# Patient Record
Sex: FEMALE | Race: WHITE | HISPANIC OR LATINO | Employment: FULL TIME | URBAN - METROPOLITAN AREA
[De-identification: names, ages, dates, MRNs, and addresses within clinical notes are randomized per-mention and may not be internally consistent; named-entity substitution may affect disease eponyms.]

---

## 2018-01-16 ENCOUNTER — OFFICE VISIT (OUTPATIENT)
Dept: MEDICAL GROUP | Facility: MEDICAL CENTER | Age: 38
End: 2018-01-16
Payer: COMMERCIAL

## 2018-01-16 VITALS
DIASTOLIC BLOOD PRESSURE: 98 MMHG | TEMPERATURE: 98.3 F | HEIGHT: 65 IN | BODY MASS INDEX: 48.82 KG/M2 | SYSTOLIC BLOOD PRESSURE: 138 MMHG | HEART RATE: 84 BPM | WEIGHT: 293 LBS | RESPIRATION RATE: 16 BRPM | OXYGEN SATURATION: 95 %

## 2018-01-16 DIAGNOSIS — E11.42 TYPE 2 DIABETES MELLITUS WITH DIABETIC POLYNEUROPATHY, WITHOUT LONG-TERM CURRENT USE OF INSULIN (HCC): ICD-10-CM

## 2018-01-16 DIAGNOSIS — G89.29 CHRONIC PAIN OF LEFT ANKLE: ICD-10-CM

## 2018-01-16 DIAGNOSIS — M25.572 CHRONIC PAIN OF LEFT ANKLE: ICD-10-CM

## 2018-01-16 DIAGNOSIS — G47.33 OBSTRUCTIVE SLEEP APNEA SYNDROME: ICD-10-CM

## 2018-01-16 DIAGNOSIS — E28.2 PCO (POLYCYSTIC OVARIES): ICD-10-CM

## 2018-01-16 DIAGNOSIS — E66.01 MORBID OBESITY WITH BMI OF 50.0-59.9, ADULT (HCC): ICD-10-CM

## 2018-01-16 DIAGNOSIS — R05.3 CHRONIC COUGH: ICD-10-CM

## 2018-01-16 DIAGNOSIS — N92.1 MENORRHAGIA WITH IRREGULAR CYCLE: ICD-10-CM

## 2018-01-16 DIAGNOSIS — M54.41 ACUTE RIGHT-SIDED LOW BACK PAIN WITH RIGHT-SIDED SCIATICA: ICD-10-CM

## 2018-01-16 PROBLEM — E11.49 TYPE 2 DIABETES MELLITUS WITH NEUROLOGIC COMPLICATION (HCC): Status: ACTIVE | Noted: 2018-01-16

## 2018-01-16 PROCEDURE — 99204 OFFICE O/P NEW MOD 45 MIN: CPT | Performed by: PHYSICIAN ASSISTANT

## 2018-01-16 RX ORDER — BACLOFEN 20 MG/1
20 TABLET ORAL 3 TIMES DAILY
Qty: 60 TAB | Refills: 0 | Status: SHIPPED | OUTPATIENT
Start: 2018-01-16 | End: 2018-04-17 | Stop reason: SDUPTHER

## 2018-01-16 RX ORDER — MEDROXYPROGESTERONE ACETATE 10 MG/1
10 TABLET ORAL DAILY
Qty: 10 TAB | Refills: 0 | Status: SHIPPED | OUTPATIENT
Start: 2018-01-16 | End: 2018-02-16

## 2018-01-16 ASSESSMENT — PATIENT HEALTH QUESTIONNAIRE - PHQ9: CLINICAL INTERPRETATION OF PHQ2 SCORE: 0

## 2018-01-16 NOTE — LETTER
January 16, 2018         Patient: Tara Romero   YOB: 1980   Date of Visit: 1/16/2018           To Whom it May Concern:    Tara Romero was seen in my clinic on 1/16/2018. She may return to work on 1/17/2018.    If you have any questions or concerns, please don't hesitate to call.        Sincerely,           Hakeem Chavarria P.A.-C.  Electronically Signed

## 2018-01-17 ENCOUNTER — HOSPITAL ENCOUNTER (OUTPATIENT)
Dept: LAB | Facility: MEDICAL CENTER | Age: 38
End: 2018-01-17
Attending: PHYSICIAN ASSISTANT
Payer: COMMERCIAL

## 2018-01-17 DIAGNOSIS — E11.42 TYPE 2 DIABETES MELLITUS WITH DIABETIC POLYNEUROPATHY, WITHOUT LONG-TERM CURRENT USE OF INSULIN (HCC): ICD-10-CM

## 2018-01-17 LAB
ALBUMIN SERPL BCP-MCNC: 4.1 G/DL (ref 3.2–4.9)
ALBUMIN/GLOB SERPL: 1.4 G/DL
ALP SERPL-CCNC: 76 U/L (ref 30–99)
ALT SERPL-CCNC: 150 U/L (ref 2–50)
ANION GAP SERPL CALC-SCNC: 7 MMOL/L (ref 0–11.9)
AST SERPL-CCNC: 70 U/L (ref 12–45)
BILIRUB SERPL-MCNC: 0.4 MG/DL (ref 0.1–1.5)
BUN SERPL-MCNC: 14 MG/DL (ref 8–22)
CALCIUM SERPL-MCNC: 8.8 MG/DL (ref 8.5–10.5)
CHLORIDE SERPL-SCNC: 105 MMOL/L (ref 96–112)
CO2 SERPL-SCNC: 26 MMOL/L (ref 20–33)
CREAT SERPL-MCNC: 0.74 MG/DL (ref 0.5–1.4)
EST. AVERAGE GLUCOSE BLD GHB EST-MCNC: 189 MG/DL
GLOBULIN SER CALC-MCNC: 3 G/DL (ref 1.9–3.5)
GLUCOSE SERPL-MCNC: 150 MG/DL (ref 65–99)
HBA1C MFR BLD: 8.2 % (ref 0–5.6)
POTASSIUM SERPL-SCNC: 3.9 MMOL/L (ref 3.6–5.5)
PROT SERPL-MCNC: 7.1 G/DL (ref 6–8.2)
SODIUM SERPL-SCNC: 138 MMOL/L (ref 135–145)

## 2018-01-17 PROCEDURE — 80053 COMPREHEN METABOLIC PANEL: CPT

## 2018-01-17 PROCEDURE — 36415 COLL VENOUS BLD VENIPUNCTURE: CPT

## 2018-01-17 PROCEDURE — 83036 HEMOGLOBIN GLYCOSYLATED A1C: CPT

## 2018-01-17 NOTE — ASSESSMENT & PLAN NOTE
Complains of a chronic cough. Coughing for greater than 3 months. Complains of sinus congestion and drainage as well. Denies any heartburn or reflux. No chest pain or shortness of breath.

## 2018-01-17 NOTE — ASSESSMENT & PLAN NOTE
Was diagnosed in the past with polycystic ovaries. Was on metformin that was helpful in regulating her cycles.

## 2018-01-17 NOTE — ASSESSMENT & PLAN NOTE
Since [place] she has had a constant period. Complains of heavy menses. It did stop for 5 days but then restarted. Denies any vaginal pain.

## 2018-01-17 NOTE — ASSESSMENT & PLAN NOTE
States that at times she will pass out for a couple seconds and then wake up. States previously she's been told that she stop breathing when she slept.

## 2018-01-17 NOTE — ASSESSMENT & PLAN NOTE
This is a 37-year-old female who is here today to establish care. Previously she had diabetes. Has not been taking her medication for quite some time. Has gained significant weight over the past few years.

## 2018-01-17 NOTE — ASSESSMENT & PLAN NOTE
2 days ago she developed low back pain with right-sided sciatica. She states that typically symptoms will resolve in time. 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 She believes that may have been the trigger. His taking ibuprofen with little relief. Needs a note to return to work tomorrow.

## 2018-01-17 NOTE — PROGRESS NOTES
Subjective:   Tara Romero is a 37 y.o. female here today for tablets can to discuss several chronic medical conditions.    Type 2 diabetes mellitus with neurologic complication (CMS-HCC)  This is a 37-year-old female who is here today to establish care. Previously she had diabetes. Has not been taking her medication for quite some time. Has gained significant weight over the past few years.    PCO (polycystic ovaries)  Was diagnosed in the past with polycystic ovaries. Was on metformin that was helpful in regulating her cycles.    Obstructive sleep apnea syndrome  States that at times she will pass out for a couple seconds and then wake up. States previously she's been told that she stop breathing when she slept.    Morbid obesity with BMI of 50.0-59.9, adult ([facility])  As mentioned above she has gained significant weight over the past few years.    Menorrhagia with irregular cycle  Since Christmas she has had a constant period. Complains of heavy menses. It did stop for 5 days but then restarted. Denies any vaginal pain.    Chronic pain of left ankle  Many years ago she sprained her left ankle. Since then she's had pain and swelling in that left ankle.    Acute right-sided low back pain with right-sided sciatica  2 days ago she developed low back pain with right-sided sciatica. She states that typically symptoms will resolve in time. 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 She believes that may have been the trigger. His taking ibuprofen with little relief. Needs a note to return to work tomorrow.    Chronic cough  Complains of a chronic cough. Coughing for greater than 3 months. Complains of sinus congestion and drainage as well. Denies any heartburn or reflux. No chest pain or shortness of breath.       Current medicines (including changes today)  Current Outpatient Prescriptions   Medication Sig Dispense Refill   • baclofen (LIORESAL) 20 MG tablet Take 1 Tab by mouth 3 times a day. 60 Tab 0   •  "medroxyPROGESTERone (PROVERA) 10 MG Tab Take 1 Tab by mouth every day. 10 Tab 0   • metformin (GLUCOPHAGE) 1000 MG tablet Take 1 Tab by mouth 2 times a day, with meals. 60 Tab 3     No current facility-administered medications for this visit.      She  has a past medical history of Hypertension.    ROS   No chest pain, no shortness of breath, no abdominal pain and all other systems were reviewed and are negative.    Past medical history, social history and family history were updated and reviewed.     Objective:     Blood pressure 138/98, pulse 84, temperature 36.8 °C (98.3 °F), resp. rate 16, height 1.651 m (5' 5\"), weight (!) 148.6 kg (327 lb 9.6 oz), last menstrual period 01/16/2018, SpO2 95 %, not currently breastfeeding. Body mass index is 54.52 kg/m².   Physical Exam:  Constitutional: Alert, no distress.  Skin: Warm, dry, good turgor, no rashes in visible areas.  Eye: Equal, round and reactive, conjunctiva clear, lids normal.  ENMT: Lips without lesions, good dentition, oropharynx clear and erythematous.  Neck: Trachea midline, no masses.   Lymph: No cervical or supraclavicular lymphadenopathy  Respiratory: Unlabored respiratory effort, lungs clear to auscultation, no wheezes, no ronchi.  Cardiovascular: Normal S1, S2, no murmur, no edema.  Musculoskeletal: Left ankle does appear to be more swollen than the right side but no significant edema noted. Range of motion is good.  Psych: Alert and oriented x3, normal affect and mood.        Assessment and Plan:   The following treatment plan was discussed    1. Type 2 diabetes mellitus with diabetic polyneuropathy, without long-term current use of insulin (CMS-[facility])  Chronic condition. Status unknown. Ordered labs fasting. We'll hold off on taking metformin 2000 mg twice a day until lab review. Refer to [facility] program.  - Patient identified as having weight management issue.  Appropriate orders and counseling given.  - HEMOGLOBIN A1C; Future  - " REFERRAL TO [facility] (HIP) Services Requested: Medical Weight Management Program; Reason for Referral? BMI>30; Reason for Visit: Overweight/Obesity  - LIPID PANEL  - COMP METABOLIC PANEL; Future  - metformin (GLUCOPHAGE) 1000 MG tablet; Take 1 Tab by mouth 2 times a day, with meals.  Dispense: 60 Tab; Refill: 3    2. Obstructive sleep apnea syndrome  Chronic condition. Refer to sleep studies.  - Patient identified as having weight management issue.  Appropriate orders and counseling given.  - REFERRAL TO [facility] (HIP) Services Requested: Medical Weight Management Program; Reason for Referral? BMI>30; Reason for Visit: Overweight/Obesity  - REFERRAL TO SLEEP STUDIES    3. Acute right-sided low back pain with right-sided sciatica  Acute, new onset condition. Continue ibuprofen as directed. Discussed with taking 600 mg every 8 hours as needed. Provided information exercise low back. Also provided prescription baclofen as directed. Note to return to work tomorrow.  - baclofen (LIORESAL) 20 MG tablet; Take 1 Tab by mouth 3 times a day.  Dispense: 60 Tab; Refill: 0    4. Morbid obesity with BMI of 50.0-59.9, adult (CMS-[facility])  Chronic condition. Referred to medical weight management program. TSH ordered.  - Patient identified as having weight management issue.  Appropriate orders and counseling given.  - REFERRAL TO [facility] (HIP) Services Requested: Medical Weight Management Program; Reason for Referral? BMI>30; Reason for Visit: Overweight/Obesity  - TSH+FREE T4    5. Menorrhagia with irregular cycle  Acute, new onset condition. Refer to gynecology to establish care. Provided Provera 10 mg for 10 days.  - medroxyPROGESTERone (PROVERA) 10 MG Tab; Take 1 Tab by mouth every day.  Dispense: 10 Tab; Refill: 0  - REFERRAL TO GYNECOLOGY    6. PCO (polycystic ovaries)  Referred to gynecology to reestablish care.  - REFERRAL TO GYNECOLOGY  - metformin  (GLUCOPHAGE) 1000 MG tablet; Take 1 Tab by mouth 2 times a day, with meals.  Dispense: 60 Tab; Refill: 3    7. Chronic pain of left ankle  Chronic condition. We'll follow and evaluate in the future visits.      Followup: Return if symptoms worsen or fail to improve.    Please note that this dictation was created using voice recognition software. I have made every reasonable attempt to correct obvious errors, but I expect that there are errors of grammar and possibly content that I did not discover before finalizing the note.

## 2018-01-17 NOTE — ASSESSMENT & PLAN NOTE
Many years ago she sprained her left ankle. Since then she's had pain and swelling in that left ankle.

## 2018-02-06 ENCOUNTER — OFFICE VISIT (OUTPATIENT)
Dept: HEALTH INFORMATION MANAGEMENT | Facility: MEDICAL CENTER | Age: 38
End: 2018-02-06
Payer: COMMERCIAL

## 2018-02-06 VITALS
TEMPERATURE: 98.4 F | WEIGHT: 293 LBS | BODY MASS INDEX: 48.82 KG/M2 | OXYGEN SATURATION: 96 % | SYSTOLIC BLOOD PRESSURE: 118 MMHG | HEIGHT: 65 IN | HEART RATE: 89 BPM | DIASTOLIC BLOOD PRESSURE: 84 MMHG

## 2018-02-06 DIAGNOSIS — G47.33 OBSTRUCTIVE SLEEP APNEA SYNDROME: ICD-10-CM

## 2018-02-06 DIAGNOSIS — R79.89 ABNORMAL LFTS: ICD-10-CM

## 2018-02-06 DIAGNOSIS — E66.01 MORBID OBESITY WITH BMI OF 50.0-59.9, ADULT (HCC): ICD-10-CM

## 2018-02-06 DIAGNOSIS — E11.42 TYPE 2 DIABETES MELLITUS WITH DIABETIC POLYNEUROPATHY, WITHOUT LONG-TERM CURRENT USE OF INSULIN (HCC): ICD-10-CM

## 2018-02-06 DIAGNOSIS — R63.2 BINGE EATING: ICD-10-CM

## 2018-02-06 DIAGNOSIS — N92.1 MENORRHAGIA WITH IRREGULAR CYCLE: ICD-10-CM

## 2018-02-06 DIAGNOSIS — Z86.59 HISTORY OF BULIMIA: ICD-10-CM

## 2018-02-06 PROCEDURE — 93000 ELECTROCARDIOGRAM COMPLETE: CPT | Performed by: INTERNAL MEDICINE

## 2018-02-06 PROCEDURE — 99204 OFFICE O/P NEW MOD 45 MIN: CPT | Mod: 25 | Performed by: INTERNAL MEDICINE

## 2018-02-06 PROCEDURE — 97802 MEDICAL NUTRITION INDIV IN: CPT | Performed by: DIETITIAN, REGISTERED

## 2018-02-06 NOTE — LETTER
Medical Weight Management  Patient Consent Form For Contrave (Naltrexone/buproprion)    I hereby authorize the physician and their staff to assist me in my weight reduction efforts. I understand that my treatment program consists of a balanced diet, a regular exercise program, instruction in behavior payton?cation techniques, meeting with a registered dietician, and the use of the appetite suppressant medication Contrave. I also understand that regular medical visits will be necessary while on the medication and that Contrave must be used with caution and under direct supervision of the physician.    Risk of Proposed Treatment: I understand that any medical treatment may involve risks as well as the proposed bene?ts of weight loss. I understand that this authorization is given with the knowledge that the use of Contrave involves risk. Risks of Contrave include but are not limited to suicidal thoughts, sudden decrease in vision, glaucoma, birth defects in your unborn child if you take this medication while pregnant, hypoglycemia.  Risks can also include nervousness, diarrhea, constipation, sleeplessness, headache, tremor, fever, fainting, dry mouth, rash, change in libido, difficulty urinating, shortness of breath, swelling of feet or ankles, tiredness, dizziness, weakness, allergic reactions, psychological imbalances, hallucinations, stomach cramps, high blood pressure, palpitations, arrhythmias, rapid heart rate, hepatitis, wilfred, depression, insomnia. In case of serious side effects or if you become pregnant, stop taking the Contrave and seek immediate medical assistance. I also understand that there are certain health risks associated with remaining overweight or obese, including high blood pressure, diabetes, heart attack and heart disease, arthritis of the joints, sleep apnea, and sudden death. Do not use with alcohol.    I further understand that Contrave should not be used by people who suffer from  glaucoma, seizures, anorexia or bulimia, those with a history of drug dependency, opioid dependency, alcoholism, psychotic illness, uncontrolled hypertension, pregnancy, on MAOI’s, serotonin migraine medications, Wellbutrin or other buproprion-containing medication, or lithium.    While taking Contrave, avoid taking the following medications: Decongestant medications (Sudafed/Pseudoephedrine, Tylenol Sinus, Claritin D, Zyrtec D and Allegra D), Stimulant medications, high doses of caffeine, other weight loss medications, ephedrine MAO inhibitions and alcohol.    Patient Responsibility:   As the patient, I understand it is my responsibility to follow instructions carefully, and to report to the physician any significant medical problems that I think may be related to my weight control program as soon as possible. I agree to notify the physician of any medical problems that I may have or any results of labs/tests ordered and reviewed by any other physician. I further acknowledge that I enter into this program in full knowledge and understanding that no physician, provider, or staff of the weight loss physician has prior knowledge as to whether I would or would not have adverse effects due to the fact that each individual has a different biological and chemical make -up. I understand the purpose of this treatment is to assist me in my desire to decrease my body weight and to maintain this weight loss. I understand my continuing to receive Contrave will be dependent on my progress in weight reduction and weight maintenance.  If I am female, I will need to have a monthly negative pregnancy test in order to receive the next month’s prescription. I understand that a balanced caloric counting program combined with regular exercise without the use of Contrave may likely prove successful if followed, even though I would be hungrier than without the suppressant.    I am also in full understanding that Contrave will be used no  longer than 3 consecutive months. After 3 months of use the medication will be discontinued.     If you and the physician agree to use the medication longer than 3 months or if your BMI has decreased below the Federal Drug Administration's recommended value you will be using the medication in an off-label manner.  Contrave may result in lethargy or depression with abrupt discontinuation and I understand that during the program, medications will be discontinued if:  1.) I become pregnant, try to become pregnant, or suspect that I am pregnant.  2.) I develop a contraindication or serious side effect of the medication.  3.) I do not comply with medical requirements, i.e. visits, med doses, etc.  4.) I fail to lose and/or maintain weight appropriately.  5.) I have a planned surgery. Medications are to be stopped at least 2 weeks prior to any surgical procedure requiring general anesthesia.    Women Only: I understand Contrave should not be taken during pregnancy, due to the chance of damage to the fetus. This has been explained to me fully, and I am aware of the risks involved. To the best of my knowledge, I am not pregnant. I am aware of the precautions that should be taken to avoid pregnancy while I am on the medication. If I become pregnant, I will advise both the physician and my OB/GYN immediately. In addition, Contrave is not to be used while breast feeding.    No Guarantee:  I understand that much of the success of the program will depend on my effort, and that there is no guarantee that the program will be successful.  I understand that I will have to continue with sensible and nutritional eating habits and regular exercise all my life, if I am able to be successful long-term.     Patient Consent/Waiver:   I have read and fully understand this document and authorize and accept the proposed care regardless of the risk. I affirm that my questions have been satisfactorily answered at this time. I realize that I  should not sign this form if all items are not understood by me or if questions have not been answered to my satisfaction. I hereby release the physician and [facility] from any liability associated and connected with my participation in this weight loss program.  I accept the risks as discussed above, in hopes of obtaining desired bene?cial results of weight loss treatment. I understand it is my responsibility to give the physician the name of my primary care physician where labs and/or EKG can be obtained for follow through and interpretation, if need be.     WARNING: If you have any questions as to the risks or hazards of the proposed treatment, or any questions whatsoever concerning the proposed treatment or other possible treatments, ask the physician now before signing this consent form. To conclude, by signing this document you are agreeing to the risks associated with Contrave. You are agreeing that to be successful in your weight loss goals you must alter your lifestyle and adopt healthy eating and exercise patterns. You are agreeing that you are not pregnant or breast feeding. You are agreeing that you understand Contrave may, in rare instances, be addictive. You are agreeing that you must notify the physician of any medical conditions current or that develop while taking Contrave and you are agreeing that this document has been adequately explained to you and that you understand the document in its entirety.    Alternatives to treatment, and no treatment, including the risks and benefits thereof have been discussed with me.      _____________________________________  Patient / Authorized Legal Representative    _____________________________________  Relationship to Patient (if not patient)    _____________________________________  Date/Time    Provider Declaration:   I have explained the contents of this document to the patient and have answered all the patient’s related questions. To the best of my  knowledge, I feel the patient has been adequately informed concerning the bene?ts and risks associated with the use of Contrave, the bene?ts and risks associated with alternative therapies, and the risks concerning an overweight status. After being adequately informed, the patient has consented.      _____________________________________  Physician Signature    _____________________________________  Physician Name (printed)     _____________________________________  Date/Time

## 2018-02-06 NOTE — PROGRESS NOTES
"2/6/2018 37 y.o.   Referring Provider: Hakeem Chavarria P.A.-C.       Time in/out:  08:52-9:23am     Anthropometrics/Objective  Today's weight:  327.5lb  Height: 65\"  BMI: 54.5   Stated Goal Weight: 160-170lb   See comprehensive patient history form for further information     Subjective:  Pt is here today for the initial screening visit for the medical weight management program.   Over the past 3 weeks she has been working to improve her diet. Is eating more veggies and protein.  Pt works late and snacks throughout the evenings.   Has been making a juice for breakfast and then a protein shake for snack in AM  Juice = 1 apple, 1 lemon, spinach, kale, celery, cucumber, bing   Protein shake = 1 cup apple juice, 1 scoop Garden of Life protein vegan protein pwder (= 130kcal, 22g protein, 5g carbs) with berries or tropical fruit mix.   Asks about steel cut oats, granola with yogurt, for health foods.   Snacks on 1/2 avocado, 1/2 can tuna, lakhani 1 T and few crackers.   Is not interested in meal replacements that we offer here.   However does not like to cook . Brings [employer] meals for dinner or other pre made meals.     See Medical Questionnaire for more detailed diet history     Nutrition Diagnosis (PES Statement)  · Morbid Obesity related to excessive energy intake and inadequate energy expenditure as evidenced by BMI >40      Client history:  Condition(s) associated with a diagnosis or treatment (specify) Type 2 DM, PCO, DAMASO     Biochemical data, medical test and procedures  Lab Results   Component Value Date/Time    HBA1C 8.2 (H) 01/17/2018 07:37 AM   @  No results found for: POCGLUCOSE  No results found for: CHOLSTRLTOT, LDL, HDL, TRIGLYCERIDE      Nutrition Intervention  Nutrition Prescription  Recommended Daily Kcals: 0200-3403 Kcal based on REE based on REE of 2056kcal minus 200-500kcal for rapid weight loss for morbid obesity   Recommended Daily Protein: 115-130g based on ~30%kcal from protein     Meal Plan " "Recommendation   Recommend 0 meal replacements with 3 grocery meals and 3 snacks  per day    Breakfast-  2 eggs with green juice   Snack- 1 scoop protein powder + water or almond milk   Lunch - 4oz protein + ns veggies + 2 healthy fats + up to 1/2 cup starch   Snack - 1oz protein + ns veggies or 1 fruit (no more than 2 fruits a day)   Dinner - 4oz protein + ns veggies + 2 healthy fats + up to 1/2 cup starch   Snack - 1oz protein + ns veggies or 1 fruit (no more than 2 fruits a day)     Juice recipe: bing, greens, cucumber, celery, and only 1 small apple.   Protein shake recipe: 1 scoop powder with water or almond milk only for AM snack     Comprehensive Nutrition Education Instruction or training leading to in-depth nutrition related knowledge about:  Combine carb, protein and fat at each meal, Meal timing and spacing, Carbs to avoid list, Healthy snacks,  Metabolism of carb, protein, fat, Portion control, Sweets and alcohol in moderation and Handouts provided regarding topics discussed     Monitoring & Evaluation Plan    Behavioral-Environmental:  Behavior: Keep a food journal and bring to next appointment   Physical activity: Not discussed today     Food / Nutrient Intake:  Food intake: Follow meal plan as discussed (see above). Avoid concentrated sweets and processed carbs.    Fluid intake: Consume at least 64 oz water per day. Avoid all unsweetened beverages. Limit coffee to 1 cup a day (ideally no cream or sugar). Avoid alcohol.     Physical Signs / Symptoms:  Weight change : -2lb+ per week to achieve -5% in first month. Goal weight of patient's is 160-170lb       Assessment Notes:    Pt is made some changes to her diet lately, however is still eating larger portions than she needs at both meals and snacks. The foods the thinks of as \"health foods\" such as smoothies, green juices, and granola are providing far too much carbohydrate for her. Pt has diabetes and needs to reduce her intake of carbohydrates. She " wants to continue to juice greens and have a protein shake so we discussed healthier recipes if wanting to do so.     Follow up 2 weeks   Adri Ellsworth RD, LD, CDE  [phone]

## 2018-02-06 NOTE — PROGRESS NOTES
Bariatric Medicine H&P  Chief Complaint   Patient presents with   • Weight Gain       Referred by:  Hakeem Chavarria P.A.-*    History of Present Illness:   Tara Romero is a 37 y.o.  female who presents for weight management and to help address co-morbidities related to overweight, including  T2DM, DAMASO, PCOS.    The patient presents concerned about ongoing weight gain. She was always heavy, but really started gaining weight when she was about 25 years old. She had bulimia around that time, went to a counselor, was told to stop looking in the mirror and being concerned about her weight. She recently did look in the mirror, and realized how heavy she was and now is seeking attention. Her weight started to increase after she was taking care of her fiancé's kids about 10 years ago. She feels she will let herself go, but now she is suffering the health consequences of her overweight and wants to try to lose weight.     She had looked into weight loss surgery, says she never got a call back from the bariatric surgeons office, and then did not have insurance for a while. She would like to try medical weight loss, would consider weight loss surgery options as well. She is not interested in meal replacements at this time, considering weight loss medication. She finds she loses some weight then regains, has never been part of a formal weight loss program or tried weight loss medications before.    In the last 3 weeks, she has been juicing, with spinach/kale/lemon/apple/Nisha, salads for lunch, teriyaki rice bowl or similar take out for dinner at work. At night, her  comes home around 11, she eats late with him even though she is not hungry. She has been trying to snack on nuts, fruits, carrots, cucumbers and tomatoes. She is mostly drinking water, one Coke every couple of days maximum.    Would consider meal replacements but not at this time.      Behavior-Related History:  Binge eating screen:  "Positive  Receptive to counseling, has had in the past for bulimia     Exercise:   None. Always sits at her computer.    Review of Systems   Fatigue, shortness of breath, cough, back pain and stiffness. Intermittent changes in bowel habits. Excessive thirst.  Sleep apnea screen: Positive, not using CPAP  All other ROS were reviewed with patient today and are negative.      PMH/PSH:  I have reviewed the patient's medical, social and family history, allergies, and medications today.  Prior records reviewed.  FHx Obesity: Positive  Personal Hx of Bariatric Surgery:  No.  Considering.  Works for [employer] with [employer] all day      Physical Exam:   /84   Pulse 89   Temp 36.9 °C (98.4 °F)   Ht 1.651 m (5' 5\")   Wt (!) 148.6 kg (327 lb 8 oz)   LMP 01/16/2018   SpO2 96%   BMI 54.50 kg/m²   Waist: 57.5 in  Body fat % 50.9  REE 2056 kcal/day    Constitutional: Oriented to person, place, and time and well-developed, well-nourished, and in no distress.    HENT: Present facial plethora.  No Cushingoid features.  No scalloped tongue.  No dental erosions.  No swollen parotids.  Head: Normocephalic.   Eyes: EOM are normal. Pupils are equal, round, and reactive to light. No periorbital edema.  No lateral thinning of eyebrows.  No vertical nystagmus.  Neck: Normal range of motion. Neck supple. No thyromegaly present. No buffalo hump.  Cardiovascular: Normal rate and regular rhythm.  No murmur heard.  Pulmonary/Chest: Effort normal and breath sounds normal. No wheezes.   Abdominal: Soft. Bowel sounds are normal. Grade 2 pannus.  No ascites.  No palpable hepatosplenomegaly. Abdominal red striae.  Musculoskeletal: Normal range of motion. Trace pitting edema bilateral feet and ankles.   Neurological: Alert and oriented to person, place, and time. Normal reflexes. No cranial nerve deficit. No muscle weakness.  Gait normal.   Skin: Warm and dry. Not diaphoretic. No hirsuitism.  No acanthosis nigricans.  " Not excessively dry, scaly.  No acne.  No bruising/ecchymosis.  No hyperpigmentation.  No xanthomas or acrochordon.  No violaceous striae.  No keratosis pilaris.  Psychiatric: Mood, memory, affect and judgment normal.     Laboratory:   Prior labs reviewed. Glu 150,  AST 70,   EKG: NSR, rate 94, no acute ST-T abnl, Qtc 0.441  Ordered and reviewed by me today.    Dietitian Assessment: I have reviewed the Dietitian's assessment related to this encounter.       ASSESSMENT/PLAN:  Body mass index is 54.5 kg/m².   Obesity Stage ([place]) 3; Class 3    1. Morbid obesity with BMI of 50.0-59.9, adult ([facility])  Naltrexone-Bupropion HCl ER 8-90 MG TABLET SR 12 HR    REFERRAL TO BEHAVIORAL HEALTH   2. Type 2 diabetes mellitus with diabetic polyneuropathy, without long-term current use of insulin (CMS-[facility])  Naltrexone-Bupropion HCl ER 8-90 MG TABLET SR 12 HR    REFERRAL TO BEHAVIORAL HEALTH   3. Obstructive sleep apnea syndrome  Naltrexone-Bupropion HCl ER 8-90 MG TABLET SR 12 HR   4. Abnormal LFTs  Naltrexone-Bupropion HCl ER 8-90 MG TABLET SR 12 HR   5. Menorrhagia with irregular cycle  Naltrexone-Bupropion HCl ER 8-90 MG TABLET SR 12 HR   6. Binge eating  REFERRAL TO BEHAVIORAL HEALTH   7. History of bulimia       The patient has significant comorbidities related to her morbid obesity. She likely would benefit most from bariatric surgery, but wants to try medical weight loss first. We will start with weight loss medications, referring her to behavioral health for binge eating as well, which she seems receptive to. Her prior history of bulimia and prior counseling she received should be addressed, as they appear to have impeded her recognition of significant weight gain.    The patient and I have discussed at length and agree to the following recommendations, which are all addressing the above diagnoses:    Weight Goal: 5% wt loss at one month after start (pt goal weight is 160-170 lb)  Diet:   High protein/low carb, 64+  oz water daily  Physical Activity: We'll discuss plan for walking next visit  Risk level for moderate/vigorous exercise program: Moderate  New Rx:   Contrave  Side Effects: Consent reviewed, signed, and chart  Behavior change: Behavioral health counseling for binge eating  Follow-up: 2 weeks  Needs liver ultrasound to eval for fatty liver  Refer to bariatric surgery, pending course.  Review whether patient has used CPAP in the past          Thank you for your referral!

## 2018-02-07 ENCOUNTER — HOSPITAL ENCOUNTER (OUTPATIENT)
Facility: MEDICAL CENTER | Age: 38
End: 2018-02-07
Attending: OBSTETRICS & GYNECOLOGY
Payer: COMMERCIAL

## 2018-02-07 PROCEDURE — 88175 CYTOPATH C/V AUTO FLUID REDO: CPT

## 2018-02-07 PROCEDURE — 87624 HPV HI-RISK TYP POOLED RSLT: CPT

## 2018-02-13 ENCOUNTER — TELEPHONE (OUTPATIENT)
Dept: MEDICAL GROUP | Facility: MEDICAL CENTER | Age: 38
End: 2018-02-13

## 2018-02-13 NOTE — TELEPHONE ENCOUNTER
Phone Number Called: [phone] (home)     Message: Called patient to scheduled an appointment to go over her sleeping issues. Patient is already scheduled for 2/16/18.    Left Message for patient to call back: no

## 2018-02-13 NOTE — TELEPHONE ENCOUNTER
VOICEMAIL  1. Caller Name: Pt                      Call Back Number: [phone] (home)     2. Message: Patient left a message this morning stating that she is having sleeping problems and is unable to sleep at night. Patient is wondering what to do.    3. Patient approves office to leave a detailed voicemail/MyChart message: no

## 2018-02-15 LAB
CYTOLOGY REG CYTOL: NORMAL
HPV HR 12 DNA CVX QL NAA+PROBE: NEGATIVE
HPV16 DNA SPEC QL NAA+PROBE: NEGATIVE
HPV18 DNA SPEC QL NAA+PROBE: NEGATIVE
SPECIMEN SOURCE: NORMAL

## 2018-02-16 ENCOUNTER — OFFICE VISIT (OUTPATIENT)
Dept: MEDICAL GROUP | Facility: MEDICAL CENTER | Age: 38
End: 2018-02-16
Payer: COMMERCIAL

## 2018-02-16 VITALS
SYSTOLIC BLOOD PRESSURE: 124 MMHG | OXYGEN SATURATION: 95 % | BODY MASS INDEX: 48.82 KG/M2 | DIASTOLIC BLOOD PRESSURE: 80 MMHG | RESPIRATION RATE: 18 BRPM | TEMPERATURE: 97.6 F | WEIGHT: 293 LBS | HEART RATE: 92 BPM | HEIGHT: 65 IN

## 2018-02-16 DIAGNOSIS — R79.89 ABNORMAL LFTS: ICD-10-CM

## 2018-02-16 DIAGNOSIS — G47.33 OBSTRUCTIVE SLEEP APNEA SYNDROME: ICD-10-CM

## 2018-02-16 DIAGNOSIS — E28.2 PCO (POLYCYSTIC OVARIES): ICD-10-CM

## 2018-02-16 DIAGNOSIS — E66.01 MORBID OBESITY WITH BMI OF 50.0-59.9, ADULT (HCC): ICD-10-CM

## 2018-02-16 DIAGNOSIS — Z00.00 PREVENTATIVE HEALTH CARE: ICD-10-CM

## 2018-02-16 DIAGNOSIS — E11.42 TYPE 2 DIABETES MELLITUS WITH DIABETIC POLYNEUROPATHY, WITHOUT LONG-TERM CURRENT USE OF INSULIN (HCC): ICD-10-CM

## 2018-02-16 DIAGNOSIS — R05.3 CHRONIC COUGH: ICD-10-CM

## 2018-02-16 PROCEDURE — 99214 OFFICE O/P EST MOD 30 MIN: CPT | Performed by: PHYSICIAN ASSISTANT

## 2018-02-16 RX ORDER — ESTERIFIED ESTROGEN AND METHYLTESTOSTERONE .625; 1.25 MG/1; MG/1
1 TABLET ORAL DAILY
COMMUNITY
End: 2018-05-25

## 2018-02-16 NOTE — ASSESSMENT & PLAN NOTE
She states during the daytime she falls asleep unknowingly. Happens multiple times a day at work. She has an appointment in May for her sleep study. She is on the wait list for any no-shows. When I had her perform labs our lab did not order a TSH or lipid panel. She also is requesting a letter from me for her HR department to verify that she is currently in the process of working up her medical conditions.

## 2018-02-16 NOTE — ASSESSMENT & PLAN NOTE
This is a 37-year-old female who is here today to discuss a couple issues. She is yet to figure out if the weight loss medication is covered by her insurance. She has to contact the pharmacy today. She states the medication a month is about $400. She states with the new diet plan she has actually gained 2 pounds.

## 2018-02-16 NOTE — ASSESSMENT & PLAN NOTE
Continues to complain of a chronic cough. Coughing is all the time. Denies any reflux symptoms. Denies any shortness of breath chest pain.

## 2018-02-16 NOTE — ASSESSMENT & PLAN NOTE
Recently her LFTs were elevated.    Results for CUAUHTEMOC PEREZ (MRN [id number]) as of 2/16/2018 08:13   Ref. Range 1/17/2018 07:37   AST(SGOT) Latest Ref Range: 12 - 45 U/L 70 (H)   ALT(SGPT) Latest Ref Range: 2 - 50 U/L 150 (H)   Alkaline Phosphatase Latest Ref Range: 30 - 99 U/L 76   Total Bilirubin Latest Ref Range: 0.1 - 1.5 mg/dL 0.4   Albumin Latest Ref Range: 3.2 - 4.9 g/dL 4.1

## 2018-02-16 NOTE — PROGRESS NOTES
Subjective:   Tara Romero is a 37 y.o. female here today for morbid obesity and sleep apnea.    Morbid obesity with BMI of 50.0-59.9, adult ([facility])  This is a 37-year-old female who is here today to discuss a couple issues. She is yet to figure out if the weight loss medication is covered by her insurance. She has to contact the pharmacy today. She states the medication a month is about $400. She states with the new diet plan she has actually gained 2 pounds.    Obstructive sleep apnea syndrome  She states during the daytime she falls asleep unknowingly. Happens multiple times a day at work. She has an appointment in May for her sleep study. She is on the wait list for any no-shows. When I had her perform labs our lab did not order a TSH or lipid panel. She also is requesting a letter from me for her HR department to verify that she is currently in the process of working up her medical conditions.    Chronic cough  Continues to complain of a chronic cough. Coughing is all the time. Denies any reflux symptoms. Denies any shortness of breath chest pain.    Abnormal LFTs  Recently her LFTs were elevated.    Results for TARA PEREZ (MRN [id number]) as of 2/16/2018 08:13   Ref. Range 1/17/2018 07:37   AST(SGOT) Latest Ref Range: 12 - 45 U/L 70 (H)   ALT(SGPT) Latest Ref Range: 2 - 50 U/L 150 (H)   Alkaline Phosphatase Latest Ref Range: 30 - 99 U/L 76   Total Bilirubin Latest Ref Range: 0.1 - 1.5 mg/dL 0.4   Albumin Latest Ref Range: 3.2 - 4.9 g/dL 4.1       Type 2 diabetes mellitus with neurologic complication (CMS-HCC)  A1c is currently at 8.2. She is taking metformin 1000 mg twice a day.    PCO (polycystic ovaries)  Her gynecologist recently placed her on birth control.       Current medicines (including changes today)  Current Outpatient Prescriptions   Medication Sig Dispense Refill   • esterified estrogens-methyltest (ESTRATEST HS) 0.625-1.25 MG Tab Take 1 Tab by mouth every day.     • Blood Glucose  "Monitoring Suppl Device Meter: Dispense Device of Insurance Preference. Sig. Use as directed for blood sugar monitoring. #1. NR. 1 Device 0   • baclofen (LIORESAL) 20 MG tablet Take 1 Tab by mouth 3 times a day. 60 Tab 0   • metformin (GLUCOPHAGE) 1000 MG tablet Take 1 Tab by mouth 2 times a day, with meals. 60 Tab 3     No current facility-administered medications for this visit.      She  has a past medical history of Hypertension.    Social History and Family History were reviewed and updated.    ROS   No chest pain, no shortness of breath, no abdominal pain and all other systems were reviewed and are negative.       Objective:     Blood pressure 124/80, pulse 92, temperature 36.4 °C (97.6 °F), resp. rate 18, height 1.651 m (5' 5\"), weight (!) 150 kg (330 lb 12.8 oz), SpO2 95 %. Body mass index is 55.05 kg/m².   Physical Exam:  Constitutional: Alert, no distress.  Skin: Warm, dry, good turgor, no rashes in visible areas.  Eye: Equal, round and reactive, conjunctiva clear, lids normal.  ENMT: Lips without lesions, good dentition, oropharynx clear.  Neck: Trachea midline, no masses.   Lymph: No cervical or supraclavicular lymphadenopathy  Respiratory: Unlabored respiratory effort, lungs clear to auscultation, no wheezes, no ronchi.  Cardiovascular: Normal S1, S2, no murmur, no edema.  Abdomen: Soft, non-tender, no masses.  Psych: Alert and oriented x3, normal affect and mood.        Assessment and Plan:   The following treatment plan was discussed    1. Morbid obesity with BMI of 50.0-59.9, adult (HCC)  Chronic condition. Follow with weight management. Check on medication cost. Advised to contact Dr. Cox if too expensive.    2. Obstructive sleep apnea syndrome  Chronic condition. Await sleep study. Letter written for her HR department informing them of having patients with working up her medical conditions.    3. Abnormal LFTs  Acute, new onset condition. Likely secondary to fatty liver. Ultrasound " ordered.  - US-LIVER AND BILIARY TREE; Future    4. Chronic cough  Chronic condition. X-ray ordered. If negative will treat with a proton pump inhibitor.  - DX-CHEST-2 VIEWS; Future    5. Type 2 diabetes mellitus with diabetic polyneuropathy, without long-term current use of insulin (CMS-HCC)  Chronic condition. Uncontrolled. Recent A1c at 8.2. Advised to continue medication. Ordered a glucometer.  - Blood Glucose Monitoring Suppl Device; Meter: Dispense Device of Insurance Preference. Sig. Use as directed for blood sugar monitoring. #1. NR.  Dispense: 1 Device; Refill: 0    6. PCO (polycystic ovaries)  Chronic condition. Continue birth control. Follow with gynecology.    7. Preventative health care  Order labs fasting. She will be contacted with the results.  - LIPID PROFILE; Future  - TSH WITH REFLEX TO FT4; Future      Followup: Return in about 4 weeks (around 3/16/2018), or if symptoms worsen or fail to improve.    Please note that this dictation was created using voice recognition software. I have made every reasonable attempt to correct obvious errors, but I expect that there are errors of grammar and possibly content that I did not discover before finalizing the note.

## 2018-02-27 ENCOUNTER — OFFICE VISIT (OUTPATIENT)
Dept: MEDICAL GROUP | Facility: MEDICAL CENTER | Age: 38
End: 2018-02-27
Payer: COMMERCIAL

## 2018-02-27 ENCOUNTER — TELEPHONE (OUTPATIENT)
Dept: MEDICAL GROUP | Facility: MEDICAL CENTER | Age: 38
End: 2018-02-27

## 2018-02-27 ENCOUNTER — SLEEP CENTER VISIT (OUTPATIENT)
Dept: SLEEP MEDICINE | Facility: MEDICAL CENTER | Age: 38
End: 2018-02-27
Payer: COMMERCIAL

## 2018-02-27 VITALS
RESPIRATION RATE: 16 BRPM | BODY MASS INDEX: 48.82 KG/M2 | HEART RATE: 98 BPM | TEMPERATURE: 98.4 F | OXYGEN SATURATION: 98 % | WEIGHT: 293 LBS | SYSTOLIC BLOOD PRESSURE: 130 MMHG | HEIGHT: 65 IN | DIASTOLIC BLOOD PRESSURE: 88 MMHG

## 2018-02-27 VITALS
RESPIRATION RATE: 18 BRPM | HEIGHT: 65 IN | WEIGHT: 293 LBS | DIASTOLIC BLOOD PRESSURE: 82 MMHG | TEMPERATURE: 97.4 F | HEART RATE: 96 BPM | BODY MASS INDEX: 48.82 KG/M2 | SYSTOLIC BLOOD PRESSURE: 130 MMHG | OXYGEN SATURATION: 95 %

## 2018-02-27 DIAGNOSIS — M54.42 ACUTE BILATERAL LOW BACK PAIN WITH BILATERAL SCIATICA: ICD-10-CM

## 2018-02-27 DIAGNOSIS — G47.33 OBSTRUCTIVE SLEEP APNEA SYNDROME: ICD-10-CM

## 2018-02-27 DIAGNOSIS — M54.41 ACUTE BILATERAL LOW BACK PAIN WITH BILATERAL SCIATICA: ICD-10-CM

## 2018-02-27 DIAGNOSIS — E11.42 TYPE 2 DIABETES MELLITUS WITH DIABETIC POLYNEUROPATHY, WITHOUT LONG-TERM CURRENT USE OF INSULIN (HCC): ICD-10-CM

## 2018-02-27 DIAGNOSIS — E66.01 MORBID OBESITY WITH BMI OF 50.0-59.9, ADULT (HCC): ICD-10-CM

## 2018-02-27 PROCEDURE — 99214 OFFICE O/P EST MOD 30 MIN: CPT | Performed by: PHYSICIAN ASSISTANT

## 2018-02-27 PROCEDURE — 99203 OFFICE O/P NEW LOW 30 MIN: CPT | Performed by: FAMILY MEDICINE

## 2018-02-27 RX ORDER — HYDROCODONE BITARTRATE AND ACETAMINOPHEN 5; 325 MG/1; MG/1
1 TABLET ORAL EVERY 8 HOURS PRN
Qty: 30 TAB | Refills: 0 | Status: SHIPPED | OUTPATIENT
Start: 2018-02-27 | End: 2018-03-13

## 2018-02-27 RX ORDER — THYROID 60 MG/1
60 TABLET ORAL DAILY
COMMUNITY
End: 2018-03-12 | Stop reason: SDUPTHER

## 2018-02-27 RX ORDER — IBUPROFEN 800 MG/1
800 TABLET ORAL EVERY 8 HOURS PRN
Qty: 40 TAB | Refills: 1 | Status: SHIPPED | OUTPATIENT
Start: 2018-02-27 | End: 2018-04-17

## 2018-02-27 ASSESSMENT — PAIN SCALES - GENERAL: PAINLEVEL: 9=SEVERE PAIN

## 2018-02-27 NOTE — PROGRESS NOTES
"     [facility]  Consult Note     Date: 2/27/2018 / Time: 8:39 AM    Patient ID:   Name:             Tara Jose     YOB: 1980  Age:                 37 y.o.  female   MRN:               [id number]      Thank you for requesting a sleep medicine consultation on Tara Romero at the sleep center. She presents today with the chief complaints of snoring, gasping, choking, witnessed apnea and excessive daytime sleepiness (EDS) . She is referred by Huey Pittman for evaluation and treatment of sleep disordered breathing.     HISTORY OF PRESENT ILLNESS:       At night,  Tara Romero goes to bed around 12-1 am on weekdays and on weekends. She gets out of bed at 7 am on weekdays and at 8-9 am on weekends.  She  averages 4 hrs of sleep on a good night and 2 hrs on a bad night. Pt has bad nights 1 nights per week. She falls asleep within few minutes. She awakens 2-3 times a night due to bathroom and choking/gasing. It takes her up to 30 min min to fall back asleep.       She not aware of snoring/witnessed apneas/gasping or choking in sleep.  She  denies any symptoms of restless legs syndrome such as an \"urge to move\"  She  legs in the evening or bedtime. She  denies any symptoms of narcolepsy such as sleep paralysis or cataplexy, or any symptoms to suggest parasomnias such as sleep walking or acting out of dreams. She  has not used any medications for her sleep problem.    Overall,  She doesnot finds her sleep refreshing. When She  wakes up in the morning, She  does does feel tired. In terms of  excessive daytime sleepiness,  She complains of sleepiness while  at work, while reading or watching TV and occasionally while driving. [place] sleepiness scale score is abnormal at  24/24.   She does regular naps. The naps are usually 30 min long around at lunch time.      REVIEW OF SYSTEMS:       Constitutional: Denies fevers, Denies weight changes  Eyes: Denies changes in vision, no " eye pain  Ears/Nose/Throat/Mouth: Denies nasal congestion or sore throat   Cardiovascular: Denies chest pain or palpitations   Respiratory: Denies shortness of breath , Denies cough  Gastrointestinal/Hepatic: Denies abdominal pain, nausea, vomiting, diarrhea, constipation or GI bleeding   Genitourinary: Denies bladder dysfunction, dysuria or frequency  Musculoskeletal/Rheum: Denies  joint pain and swelling   Skin/Breast: Denies rash, denies breast lumps or discharge  Neurological: Denies headache, confusion, memory loss or focal weakness/parasthesias  Psychiatric: denies mood disorder     Comprehensive review of systems form is reviewed with the patient and is attached in the EMR.     PMH:  has a past medical history of Hypertension.  MEDS:   Current Outpatient Prescriptions:   •  esterified estrogens-methyltest (ESTRATEST HS) 0.625-1.25 MG Tab, Take 1 Tab by mouth every day., Disp: , Rfl:   •  Blood Glucose Monitoring Suppl Device, Meter: Dispense Device of Insurance Preference. Sig. Use as directed for blood sugar monitoring. #1. NR., Disp: 1 Device, Rfl: 0  •  baclofen (LIORESAL) 20 MG tablet, Take 1 Tab by mouth 3 times a day., Disp: 60 Tab, Rfl: 0  •  metformin (GLUCOPHAGE) 1000 MG tablet, Take 1 Tab by mouth 2 times a day, with meals., Disp: 60 Tab, Rfl: 3  ALLERGIES:   Allergies   Allergen Reactions   • Asa [Aspirin] Vomiting and Swelling     Facial swelling     SURGHX: No past surgical history on file.  SOCHX:  reports that she has never smoked. She has never used smokeless tobacco. She reports that she does not drink alcohol or use drugs..  FH:   Family History   Problem Relation Age of Onset   • Hypertension Mother    • Heart Disease Mother    • Arthritis Mother    • Diabetes Father    • Heart Disease Father            Physical Exam:  Vitals/ General Appearance:   Weight/BMI: There is no height or weight on file to calculate BMI.  There were no vitals taken for this visit.  There were no vitals filed for  this visit.    Pt. is alert and oriented to time, place and person. Cooperative and in no apparent distress.       1. Head: Atraumatic, normocephalic.   2. Ears: Normal tympanic membrane and no discharge  3. Nose: No inferior turbinate hypertophy, no septal deviation, no polyp.   4. Throat: Oropharynx appears crowded in that the palate is overhanging (Malam Ary scale 4. Tonsils are not enlarged, uvula is large, pharynx not inflamed. Tongue is large. She has intact dentition and oral hygiene is fair.  5. Neck: Supple.   6. Chest: Trachea central  7. Lungs auscultation: B/L good air entry, vesicular breath sounds, no adventitious sounds  8. Heart auscultation: 1st and 2nd heart sounds normal, regular rhythm. No appreciable murmur.  9. Abdomen: Soft, non tender, no organomegaly. Bowel sounds present  10. Extremities:  no pedal edema.   Peripheral pulses felt.  11. Skin: No rash  12. NEUROLOGICAL EXAMINATION: On neurological exam, the patient was alert and oriented x3. speech was clear and fluent without dysarthria. Motor exam revealed normal bulk, tone and strength 5/5, which was symmetrical in the upper and lower extremities bilaterally.     INVESTIGATIONS:       ASSESSMENT AND PLAN     1. She  has symptoms of Obstructive Sleep Apnea (DAMASO). She  has excessive daytime sleepiness that interferes with activites of daily living. She  risk factors for DAMASO include obesity, thick neck, and crowded oropharynx.     The pathophysiology of DAMASO and the increased risk of cardiovascular morbidity from untreated DAMASO is discussed in detail with the patient. She  also has HTN and DM which can be worsened by her DAMASO.currently on medication and it is stable.      We have discussed diagnostic options including in-laboratory, attended polysomnography and home sleep testing. We have also discussed treatment options including airway pressurization, reconstructive otolaryngologic surgery, dental appliances and weight management.     She   will be scheduled for HST to assess sleep related breathing disorder.     Subsequently,treatment options will be discussed based on the diagnostic study. Meanwhile, She is urged to avoid supine sleep, weight gain and alcoholic beverages since all of these can worsen DAMASO. She is cautioned against drowsy driving. If She feels sleepy while driving, She must pull over for a break/nap, rather than persist on the road, in the interest of She own safety and that of others on the road.    2. Obesity  - recommended healthy diet with portion control , aerobic exercise 5x week  - she is in a medical weight management program  - obesity counseling done today: Drink more water. Reduce carb intake in drinks. Try to eat 3 meals a day with last meal 4-6 hours prior to bedtime. Limit caloric intake to 1600 - 1800 kcal per day.Restrict carbohydrate intake to 50 g per day to 100 g per day         3. Regarding treatment of other past medical problems and general health maintenance,  She is urged to follow up with PCP.

## 2018-02-27 NOTE — PROGRESS NOTES
Subjective:   Tara Romero is a 37 y.o. female here today for low back pain for 4 days.    Acute bilateral low back pain with bilateral sciatica  This is a 37-year-old female who is here today to discuss acute back pain for 4 days. Denies any triggers. Denies any possible exacerbations. Has had back pain in the past but not significant. Because of a history of sciatica. Currently complains of sharp low back pain with numbness radiating down both legs. Denies pain that radiates down the legs. She has been taking a muscle relaxer which is normally effective. Also taking ibuprofen 800 mg as needed. Medications aren't really helping. She denies any saddle anesthesia. About maybe seeing a chiropractor.    Morbid obesity with BMI of 50.0-59.9, adult ([facility])  She has been eating as she was told by her weight loss physician and dietitian. Unfortunately she states that she is gaining weight. Has gained another 3 pounds. She was unable to fill the medication that was provided because of the cost. She has an appointment the 28th.    Obstructive sleep apnea syndrome  Recently saw sleep Center. She will have a home sleep study and then it will be return next month on the seventh.       Current medicines (including changes today)  Current Outpatient Prescriptions   Medication Sig Dispense Refill   • ibuprofen (MOTRIN) 800 MG Tab Take 1 Tab by mouth every 8 hours as needed. With food. 40 Tab 1   • HYDROcodone-acetaminophen (NORCO) 5-325 MG Tab per tablet Take 1 Tab by mouth every 8 hours as needed for up to 14 days. 30 Tab 0   • esterified estrogens-methyltest (ESTRATEST HS) 0.625-1.25 MG Tab Take 1 Tab by mouth every day.     • baclofen (LIORESAL) 20 MG tablet Take 1 Tab by mouth 3 times a day. 60 Tab 0   • thyroid (ARMOUR THYROID) 60 MG Tab Take 60 mg by mouth every day.     • Blood Glucose Monitoring Suppl Device Meter: Dispense Device of Insurance Preference. Sig. Use as directed for blood sugar monitoring. #1. NR. 1  "Device 0   • metformin (GLUCOPHAGE) 1000 MG tablet Take 1 Tab by mouth 2 times a day, with meals. 60 Tab 3     No current facility-administered medications for this visit.      She  has a past medical history of Hypertension.    Social History and Family History were reviewed and updated.    ROS   No chest pain, no shortness of breath, no abdominal pain and all other systems were reviewed and are negative.       Objective:     Blood pressure 130/82, pulse 96, temperature 36.3 °C (97.4 °F), resp. rate 18, height 1.651 m (5' 5\"), weight (!) 149.7 kg (330 lb), SpO2 95 %. Body mass index is 54.91 kg/m².   Physical Exam:  Constitutional: Alert, no distress.  Skin: Warm, dry, good turgor, no rashes in visible areas.  Eye: Equal, round and reactive, conjunctiva clear, lids normal.  ENMT: Lips without lesions, good dentition, oropharynx clear.  Neck: Trachea midline, no masses.   Lymph: No cervical or supraclavicular lymphadenopathy  Respiratory: Unlabored respiratory effort, lungs appear clear, no wheezes.  Cardiovascular: Normal S1, S2, no murmur, no edema.  Musculoskeletal: No spinal tenderness. Left-sided paraspinal tenderness of the lumbar region. Muscle strength bilaterally appear to be 2 out of 5.  Neuro: Unable to elicit patellar DTRs.  Psych: Alert and oriented x3, normal affect and mood.        Assessment and Plan:   The following treatment plan was discussed    1. Acute bilateral low back pain with bilateral sciatica  Acute, new onset condition. Referred to chiropractic services to establish care. If symptoms are not improving we'll order MRI of her lumbar spine. Ordered ibuprofen 800 mg with food as needed. Continue baclofen. Also provided Norco.  reviewed. Take as directed. Discussed with a controlled substance. No refills will be provided.  - ibuprofen (MOTRIN) 800 MG Tab; Take 1 Tab by mouth every 8 hours as needed. With food.  Dispense: 40 Tab; Refill: 1  - REFERRAL TO CHIROPRACTIC  - " HYDROcodone-acetaminophen (NORCO) 5-325 MG Tab per tablet; Take 1 Tab by mouth every 8 hours as needed for up to 14 days.  Dispense: 30 Tab; Refill: 0    2. Morbid obesity with BMI of 50.0-59.9, adult ([facility])  Chronic condition. Follow-up with dietitian and weight loss physician.    3. Obstructive sleep apnea syndrome  Chronic condition. Follow up with sleep study.      Followup: Return if symptoms worsen or fail to improve.    Please note that this dictation was created using voice recognition software. I have made every reasonable attempt to correct obvious errors, but I expect that there are errors of grammar and possibly content that I did not discover before finalizing the note.

## 2018-02-27 NOTE — PATIENT INSTRUCTIONS
Calorie Counting for Weight Loss  Calories are energy you get from the things you eat and drink. Your body uses this energy to keep you going throughout the day. The number of calories you eat affects your weight. When you eat more calories than your body needs, your body stores the extra calories as fat. When you eat fewer calories than your body needs, your body burns fat to get the energy it needs.  Calorie counting means keeping track of how many calories you eat and drink each day. If you make sure to eat fewer calories than your body needs, you should lose weight. In order for calorie counting to work, you will need to eat the number of calories that are right for you in a day to lose a healthy amount of weight per week. A healthy amount of weight to lose per week is usually 1-2 lb (0.5-0.9 kg). A dietitian can determine how many calories you need in a day and give you suggestions on how to reach your calorie goal.   WHAT IS MY MY PLAN?  My goal is to have __________ calories per day.   If I have this many calories per day, I should lose around __________ pounds per week.  WHAT DO I NEED TO KNOW ABOUT CALORIE COUNTING?  In order to meet your daily calorie goal, you will need to:  · Find out how many calories are in each food you would like to eat. Try to do this before you eat.  · Decide how much of the food you can eat.  · Write down what you ate and how many calories it had. Doing this is called keeping a food log.  WHERE DO I FIND CALORIE INFORMATION?  The number of calories in a food can be found on a Nutrition Facts label. Note that all the information on a label is based on a specific serving of the food. If a food does not have a Nutrition Facts label, try to look up the calories online or ask your dietitian for help.  HOW DO I DECIDE HOW MUCH TO EAT?  To decide how much of the food you can eat, you will need to consider both the number of calories in one serving and the size of one serving. This  information can be found on the Nutrition Facts label. If a food does not have a Nutrition Facts label, look up the information online or ask your dietitian for help.  Remember that calories are listed per serving. If you choose to have more than one serving of a food, you will have to multiply the calories per serving by the amount of servings you plan to eat. For example, the label on a package of bread might say that a serving size is 1 slice and that there are 90 calories in a serving. If you eat 1 slice, you will have eaten 90 calories. If you eat 2 slices, you will have eaten 180 calories.  HOW DO I KEEP A FOOD LOG?  After each meal, record the following information in your food log:  · What you ate.  · How much of it you ate.  · How many calories it had.  · Then, add up your calories.  Keep your food log near you, such as in a small notebook in your pocket. Another option is to use a mobile georges or website. Some programs will calculate calories for you and show you how many calories you have left each time you add an item to the log.  WHAT ARE SOME CALORIE COUNTING TIPS?  · Use your calories on foods and drinks that will fill you up and not leave you hungry. Some examples of this include foods like nuts and nut butters, vegetables, lean proteins, and high-fiber foods (more than 5 g fiber per serving).  · Eat nutritious foods and avoid empty calories. Empty calories are calories you get from foods or beverages that do not have many nutrients, such as candy and soda. It is better to have a nutritious high-calorie food (such as an avocado) than a food with few nutrients (such as a bag of chips).  · Know how many calories are in the foods you eat most often. This way, you do not have to look up how many calories they have each time you eat them.  · Look out for foods that may seem like low-calorie foods but are really high-calorie foods, such as baked goods, soda, and fat-free candy.  · Pay attention to calories  in drinks. Drinks such as sodas, specialty coffee drinks, alcohol, and juices have a lot of calories yet do not fill you up. Choose low-calorie drinks like water and diet drinks.  · Focus your calorie counting efforts on higher calorie items. Logging the calories in a garden salad that contains only vegetables is less important than calculating the calories in a milk shake.  · Find a way of tracking calories that works for you. Get creative. Most people who are successful find ways to keep track of how much they eat in a day, even if they do not count every calorie.  WHAT ARE SOME PORTION CONTROL TIPS?  · Know how many calories are in a serving. This will help you know how many servings of a certain food you can have.  · Use a measuring cup to measure serving sizes. This is helpful when you start out. With time, you will be able to estimate serving sizes for some foods.  · Take some time to put servings of different foods on your favorite plates, bowls, and cups so you know what a serving looks like.  · Try not to eat straight from a bag or box. Doing this can lead to overeating. Put the amount you would like to eat in a cup or on a plate to make sure you are eating the right portion.  · Use smaller plates, glasses, and bowls to prevent overeating. This is a quick and easy way to practice portion control. If your plate is smaller, less food can fit on it.  · Try not to multitask while eating, such as watching TV or using your computer. If it is time to eat, sit down at a table and enjoy your food. Doing this will help you to start recognizing when you are full. It will also make you more aware of what and how much you are eating.  HOW CAN I CALORIE COUNT WHEN EATING OUT?  · Ask for smaller portion sizes or child-sized portions.  · Consider sharing an entree and sides instead of getting your own entree.  · If you get your own entree, eat only half. Ask for a box at the beginning of your meal and put the rest of your  "entree in it so you are not tempted to eat it.  · Look for the calories on the menu. If calories are listed, choose the lower calorie options.  · Choose dishes that include vegetables, fruits, whole grains, low-fat dairy products, and lean protein. Focusing on smart food choices from each of the 5 food groups can help you stay on track at restaurants.  · Choose items that are boiled, broiled, grilled, or steamed.  · Choose water, milk, unsweetened iced tea, or other drinks without added sugars. If you want an alcoholic beverage, choose a lower calorie option. For example, a regular shakeel can have up to 700 calories and a glass of wine has around 150.  · Stay away from items that are buttered, battered, fried, or served with cream sauce. Items labeled \"crispy\" are usually fried, unless stated otherwise.  · Ask for dressings, sauces, and syrups on the side. These are usually very high in calories, so do not eat much of them.  · Watch out for salads. Many people think salads are a healthy option, but this is often not the case. Many salads come with rodriguez, fried chicken, lots of cheese, fried chips, and dressing. All of these items have a lot of calories. If you want a salad, choose a garden salad and ask for grilled meats or steak. Ask for the dressing on the side, or ask for olive oil and vinegar or lemon to use as dressing.  · Estimate how many servings of a food you are given. For example, a serving of cooked rice is ½ cup or about the size of half a tennis ball or one cupcake wrapper. Knowing serving sizes will help you be aware of how much food you are eating at restaurants. The list below tells you how big or small some common portion sizes are based on everyday objects.  ¨ 1 oz--4 stacked dice.  ¨ 3 oz--1 deck of cards.  ¨ 1 tsp--1 dice.  ¨ 1 Tbsp--½ a Ping-Pong ball.  ¨ 2 Tbsp--1 Ping-Pong ball.  ¨ ½ cup--1 tennis ball or 1 cupcake wrapper.  ¨ 1 cup--1 baseball.     This information is not intended to " replace advice given to you by your health care provider. Make sure you discuss any questions you have with your health care provider.     Document Released: 12/18/2006 Document Revised: 01/08/2016 Document Reviewed: 10/23/2014  [employer] Interactive Patient Education ©2016 [employer]  Sleep Apnea   Sleep apnea is a sleep disorder characterized by abnormal pauses in breathing while you sleep. When your breathing pauses, the level of oxygen in your blood decreases. This causes you to move out of deep sleep and into light sleep. As a result, your quality of sleep is poor, and the system that carries your blood throughout your body (cardiovascular system) experiences stress. If sleep apnea remains untreated, the following conditions can develop:  · High blood pressure (hypertension).  · Coronary artery disease.  · Inability to achieve or maintain an erection (impotence).  · Impairment of your thought process (cognitive dysfunction).  There are three types of sleep apnea:  1. Obstructive sleep apnea--Pauses in breathing during sleep because of a blocked airway.  2. Central sleep apnea--Pauses in breathing during sleep because the area of the brain that controls your breathing does not send the correct signals to the muscles that control breathing.  3. Mixed sleep apnea--A combination of both obstructive and central sleep apnea.  RISK FACTORS  The following risk factors can increase your risk of developing sleep apnea:  · Being overweight.  · Smoking.  · Having narrow passages in your nose and throat.  · Being of older age.  · Being male.  · Alcohol use.  · Sedative and tranquilizer use.  · Ethnicity. Among individuals younger than 35 years,  Americans are at increased risk of sleep apnea.  SYMPTOMS   · Difficulty staying asleep.  · Daytime sleepiness and fatigue.  · Loss of energy.  · Irritability.  · Loud, heavy snoring.  · Morning headaches.  · Trouble concentrating.  · Forgetfulness.  · Decreased interest in  "sex.  · Unexplained sleepiness.  DIAGNOSIS   In order to diagnose sleep apnea, your caregiver will perform a physical examination. A sleep study done in the comfort of your own home may be appropriate if you are otherwise healthy. Your caregiver may also recommend that you spend the night in a sleep lab. In the sleep lab, several monitors record information about your heart, lungs, and brain while you sleep. Your leg and arm movements and blood oxygen level are also recorded.  TREATMENT  The following actions may help to resolve mild sleep apnea:  · Sleeping on your side.    · Using a decongestant if you have nasal congestion.    · Avoiding the use of depressants, including alcohol, sedatives, and narcotics.    · Losing weight and modifying your diet if you are overweight.  There also are devices and treatments to help open your airway:  · Oral appliances. These are custom-made mouthpieces that shift your lower jaw forward and slightly open your bite. This opens your airway.  · Devices that create positive airway pressure. This positive pressure \"splints\" your airway open to help you breathe better during sleep. The following devices create positive airway pressure:  ¨ Continuous positive airway pressure (CPAP) device. The CPAP device creates a continuous level of air pressure with an air pump. The air is delivered to your airway through a mask while you sleep. This continuous pressure keeps your airway open.  ¨ Nasal expiratory positive airway pressure (EPAP) device. The EPAP device creates positive air pressure as you exhale. The device consists of single-use valves, which are inserted into each nostril and held in place by adhesive. The valves create very little resistance when you inhale but create much more resistance when you exhale. That increased resistance creates the positive airway pressure. This positive pressure while you exhale keeps your airway open, making it easier to breath when you inhale " again.  ¨ Bilevel positive airway pressure (BPAP) device. The BPAP device is used mainly in patients with central sleep apnea. This device is similar to the CPAP device because it also uses an air pump to deliver continuous air pressure through a mask. However, with the BPAP machine, the pressure is set at two different levels. The pressure when you exhale is lower than the pressure when you inhale.  · Surgery. Typically, surgery is only done if you cannot comply with less invasive treatments or if the less invasive treatments do not improve your condition. Surgery involves removing excess tissue in your airway to create a wider passage way.     This information is not intended to replace advice given to you by your health care provider. Make sure you discuss any questions you have with your health care provider.     Document Released: 12/08/2003 Document Revised: 01/08/2016 Document Reviewed: 04/25/2013  [employer] Interactive Patient Education ©2016 [employer]

## 2018-02-27 NOTE — ASSESSMENT & PLAN NOTE
Recently saw sleep Center. She will have a home sleep study and then it will be return next month on the seventh.

## 2018-02-27 NOTE — ASSESSMENT & PLAN NOTE
She has been eating as she was told by her weight loss physician and dietitian. Unfortunately she states that she is gaining weight. Has gained another 3 pounds. She was unable to fill the medication that was provided because of the cost. She has an appointment the 28th.

## 2018-02-27 NOTE — ASSESSMENT & PLAN NOTE
This is a 37-year-old female who is here today to discuss acute back pain for 4 days. Denies any triggers. Denies any possible exacerbations. Has had back pain in the past but not significant. Because of a history of sciatica. Currently complains of sharp low back pain with numbness radiating down both legs. Denies pain that radiates down the legs. She has been taking a muscle relaxer which is normally effective. Also taking ibuprofen 800 mg as needed. Medications aren't really helping. She denies any saddle anesthesia. About maybe seeing a chiropractor.

## 2018-02-27 NOTE — TELEPHONE ENCOUNTER
1. Caller Name: Pt                                         Call Back Number: [phone] (home)       Patient approves a detailed voicemail message: no    2. What are the patient's symptoms (location & severity)? Back and both legs 8 out 10    3. Is this a new symptom Yes    4. When did it start? 2/26/18    5. Action taken per Active Symptom Guide: Urgent Care recommended    6. Patient agrees to recommended action per active symptom guide

## 2018-02-28 ENCOUNTER — OFFICE VISIT (OUTPATIENT)
Dept: HEALTH INFORMATION MANAGEMENT | Facility: MEDICAL CENTER | Age: 38
End: 2018-02-28
Payer: COMMERCIAL

## 2018-02-28 VITALS
OXYGEN SATURATION: 93 % | HEART RATE: 99 BPM | DIASTOLIC BLOOD PRESSURE: 82 MMHG | SYSTOLIC BLOOD PRESSURE: 118 MMHG | BODY MASS INDEX: 48.82 KG/M2 | TEMPERATURE: 98.1 F | WEIGHT: 293 LBS | HEIGHT: 65 IN

## 2018-02-28 DIAGNOSIS — R63.2 BINGE EATING: ICD-10-CM

## 2018-02-28 DIAGNOSIS — E28.2 PCO (POLYCYSTIC OVARIES): ICD-10-CM

## 2018-02-28 DIAGNOSIS — G47.33 OBSTRUCTIVE SLEEP APNEA SYNDROME: ICD-10-CM

## 2018-02-28 DIAGNOSIS — E66.01 MORBID OBESITY WITH BMI OF 50.0-59.9, ADULT (HCC): ICD-10-CM

## 2018-02-28 DIAGNOSIS — E11.42 TYPE 2 DIABETES MELLITUS WITH DIABETIC POLYNEUROPATHY, WITHOUT LONG-TERM CURRENT USE OF INSULIN (HCC): ICD-10-CM

## 2018-02-28 PROCEDURE — 99213 OFFICE O/P EST LOW 20 MIN: CPT | Performed by: INTERNAL MEDICINE

## 2018-02-28 NOTE — PROGRESS NOTES
Bariatric Medicine Follow Up  Chief Complaint   Patient presents with   • Weight Gain       History of Present Illness:   Tara Romero is a 37 y.o. female who presents for weight management follow-up and to help address co-morbidities related to overweight, including T2DM, DAMASO, PCOS.    During the patient's last visit, the following were discussed and recommended:  Weight Goal: 5% wt loss at one month after start (pt goal weight is 160-170 lb)  Diet:   High protein/low carb, 64+ oz water daily  Physical Activity: We'll discuss plan for walking next visit  Risk level for moderate/vigorous exercise program: Moderate  New Rx:   Contrave  Side Effects: Consent reviewed, signed, and chart  Behavior change: Behavioral health counseling for binge eating  Follow-up: 2 weeks  Needs liver ultrasound to eval for fatty liver  Refer to bariatric surgery, pending course.  Review whether patient has used CPAP in the past    The patient has been following the food changes were recommended, is frustrated she has lost some waist inches but not weight. Has been binge eating much less, without weight loss medication. She does admit she has severe PCOS symptoms at the moment, is having a very heavy menses, waiting to get on birth control through her primary care physician later today. Did not start the Contrave due to expense (quoted $170 per month). Will be getting a liver ultrasound soon, sleep study as well.    She has been reducing her carbohydrate intake significantly, is not tracking. Has not been tracking her blood sugars consistently, thinks they have improved. Binge eating has improved. Has not heard back from behavioral health counseling yet. Sleep about the same, waiting for CPAP device after sleep study (was told she has DAMASO).      Exercise:   None.     Review of Systems   Low back pain, heavy menses.  All other ROS were reviewed and are otherwise unchanged from my previous visit with patient.    Physical Exam:    BP  "118/82   Pulse 99   Temp 36.7 °C (98.1 °F)   Ht 1.651 m (5' 5\")   Wt (!) 151.6 kg (334 lb 3.2 oz)   SpO2 93%   BMI 55.61 kg/m²   Waist: 56 in  Body fat % 48.9  REE 2078 kcal/day    Weight change since last visit: +8 lb   Waist Circum change since last visit: -0.5 in     Constitutional: Oriented to person, place, and time and well-developed, well-nourished, and in no distress.    Head: Normocephalic.   Musculoskeletal: Normal range of motion. No edema.   Neurological: Alert and oriented to person, place, and time. No muscle weakness.  Gait normal.   Skin: Warm and dry. Not diaphoretic.   Psychiatric: Mood, memory, affect and judgment normal.     Laboratory:   None new.      Dietitian Assessment: I have reviewed the Dietitian's assessment related to this encounter.       ASSESSMENT/PLAN:  Body mass index is 55.61 kg/m².    Obesity Stage ([place]):  3; Class 3    1. Morbid obesity with BMI of 50.0-59.9, adult ([facility])     2. Obstructive sleep apnea syndrome     3. Binge eating     4. Type 2 diabetes mellitus with diabetic polyneuropathy, without long-term current use of insulin ([facility])     5. PCO (polycystic ovaries)       The patient has lost some weight and waist circumference.  Her fat mass percent is down. She likely has increased weight gain from her significant menses/PCOS and water retention. Pending  prescription for birth control through PCP later today. Await sleep study, liver ultrasound to evaluate for DAMASO/CPAP requirements and fatty liver, respectively.    The patient and I have discussed at length and agree to the following recommendations, which are all addressing the above diagnoses:    Weight Goal: 3-5% wt loss each month (pt goal weight is 160 lb)  Diet: High-protein/low carb as previously prescribed  64+ ounces of water per day  Physical Activity: Walking as tolerated for now  Risk level for moderate/vigorous exercise program: Moderate  New Rx: Has not yet filled the Contrave " prescription  Wants to consider phentermine/topiramate (patient needs to be on birth control for this prescription)  Side Effects: Will review consent if applicable.  Behavior change: Continue mindful eating  Follow-up: One month  Patient to call behavioral health counseling for appointment.  Review sleep study, liver ultrasound results at follow-up visit (as ordered by PCP)  May benefit from evaluation for surgical weight loss.

## 2018-03-02 ENCOUNTER — APPOINTMENT (OUTPATIENT)
Dept: RADIOLOGY | Facility: MEDICAL CENTER | Age: 38
End: 2018-03-02
Attending: PHYSICIAN ASSISTANT
Payer: COMMERCIAL

## 2018-03-02 ENCOUNTER — APPOINTMENT (OUTPATIENT)
Dept: RADIOLOGY | Facility: MEDICAL CENTER | Age: 38
End: 2018-03-02
Attending: OBSTETRICS & GYNECOLOGY
Payer: COMMERCIAL

## 2018-03-07 ENCOUNTER — HOME STUDY (OUTPATIENT)
Dept: SLEEP MEDICINE | Facility: MEDICAL CENTER | Age: 38
End: 2018-03-07
Attending: FAMILY MEDICINE
Payer: COMMERCIAL

## 2018-03-07 DIAGNOSIS — E11.42 TYPE 2 DIABETES MELLITUS WITH DIABETIC POLYNEUROPATHY, WITHOUT LONG-TERM CURRENT USE OF INSULIN (HCC): ICD-10-CM

## 2018-03-07 DIAGNOSIS — E66.01 MORBID OBESITY WITH BMI OF 50.0-59.9, ADULT (HCC): ICD-10-CM

## 2018-03-07 DIAGNOSIS — G47.33 OBSTRUCTIVE SLEEP APNEA SYNDROME: ICD-10-CM

## 2018-03-07 PROCEDURE — 95806 SLEEP STUDY UNATT&RESP EFFT: CPT | Performed by: FAMILY MEDICINE

## 2018-03-09 ENCOUNTER — HOSPITAL ENCOUNTER (OUTPATIENT)
Dept: RADIOLOGY | Facility: MEDICAL CENTER | Age: 38
End: 2018-03-09
Attending: OBSTETRICS & GYNECOLOGY
Payer: COMMERCIAL

## 2018-03-09 ENCOUNTER — TELEPHONE (OUTPATIENT)
Dept: MEDICAL GROUP | Facility: MEDICAL CENTER | Age: 38
End: 2018-03-09

## 2018-03-09 ENCOUNTER — HOSPITAL ENCOUNTER (OUTPATIENT)
Dept: LAB | Facility: MEDICAL CENTER | Age: 38
End: 2018-03-09
Attending: PHYSICIAN ASSISTANT
Payer: COMMERCIAL

## 2018-03-09 ENCOUNTER — HOSPITAL ENCOUNTER (OUTPATIENT)
Dept: RADIOLOGY | Facility: MEDICAL CENTER | Age: 38
End: 2018-03-09
Attending: PHYSICIAN ASSISTANT
Payer: COMMERCIAL

## 2018-03-09 ENCOUNTER — HOSPITAL ENCOUNTER (OUTPATIENT)
Dept: LAB | Facility: MEDICAL CENTER | Age: 38
End: 2018-03-09
Attending: OBSTETRICS & GYNECOLOGY
Payer: COMMERCIAL

## 2018-03-09 DIAGNOSIS — Z00.00 PREVENTATIVE HEALTH CARE: ICD-10-CM

## 2018-03-09 DIAGNOSIS — N93.9 VAGINAL HEMORRHAGE: ICD-10-CM

## 2018-03-09 DIAGNOSIS — R05.3 CHRONIC COUGH: ICD-10-CM

## 2018-03-09 DIAGNOSIS — R79.89 ABNORMAL LFTS: ICD-10-CM

## 2018-03-09 LAB
BASOPHILS # BLD AUTO: 0.4 % (ref 0–1.8)
BASOPHILS # BLD: 0.03 K/UL (ref 0–0.12)
CHOLEST SERPL-MCNC: 177 MG/DL (ref 100–199)
EOSINOPHIL # BLD AUTO: 0.2 K/UL (ref 0–0.51)
EOSINOPHIL NFR BLD: 2.5 % (ref 0–6.9)
ERYTHROCYTE [DISTWIDTH] IN BLOOD BY AUTOMATED COUNT: 48.4 FL (ref 35.9–50)
HCT VFR BLD AUTO: 44 % (ref 37–47)
HDLC SERPL-MCNC: 61 MG/DL
HGB BLD-MCNC: 13.3 G/DL (ref 12–16)
IMM GRANULOCYTES # BLD AUTO: 0.02 K/UL (ref 0–0.11)
IMM GRANULOCYTES NFR BLD AUTO: 0.2 % (ref 0–0.9)
LDLC SERPL CALC-MCNC: 94 MG/DL
LYMPHOCYTES # BLD AUTO: 2.11 K/UL (ref 1–4.8)
LYMPHOCYTES NFR BLD: 26.2 % (ref 22–41)
MCH RBC QN AUTO: 22.3 PG (ref 27–33)
MCHC RBC AUTO-ENTMCNC: 30.2 G/DL (ref 33.6–35)
MCV RBC AUTO: 73.8 FL (ref 81.4–97.8)
MONOCYTES # BLD AUTO: 0.61 K/UL (ref 0–0.85)
MONOCYTES NFR BLD AUTO: 7.6 % (ref 0–13.4)
NEUTROPHILS # BLD AUTO: 5.08 K/UL (ref 2–7.15)
NEUTROPHILS NFR BLD: 63.1 % (ref 44–72)
NRBC # BLD AUTO: 0 K/UL
NRBC BLD-RTO: 0 /100 WBC
PLATELET # BLD AUTO: 230 K/UL (ref 164–446)
RBC # BLD AUTO: 5.96 M/UL (ref 4.2–5.4)
TRIGL SERPL-MCNC: 109 MG/DL (ref 0–149)
TSH SERPL DL<=0.005 MIU/L-ACNC: 2.1 UIU/ML (ref 0.38–5.33)
WBC # BLD AUTO: 8.1 K/UL (ref 4.8–10.8)

## 2018-03-09 PROCEDURE — 84443 ASSAY THYROID STIM HORMONE: CPT

## 2018-03-09 PROCEDURE — 76705 ECHO EXAM OF ABDOMEN: CPT

## 2018-03-09 PROCEDURE — 85025 COMPLETE CBC W/AUTO DIFF WBC: CPT

## 2018-03-09 PROCEDURE — 76830 TRANSVAGINAL US NON-OB: CPT

## 2018-03-09 PROCEDURE — 36415 COLL VENOUS BLD VENIPUNCTURE: CPT

## 2018-03-09 PROCEDURE — 71046 X-RAY EXAM CHEST 2 VIEWS: CPT

## 2018-03-09 PROCEDURE — 80061 LIPID PANEL: CPT

## 2018-03-09 NOTE — PROCEDURES
DIAGNOSTIC HOME SLEEP TEST (HST) REPORT       PATIENT ID:  NAME:  Tara Romero  MRN:               [id number]  YOB: 1980  DATE OF STUDY: 3/8/18      Impression:     This study shows evidence of:     1. Sever obstructive sleep apnea with  Respiratory Event Index (REN) of 79 per hour and worse in supine sleep with REN at 87. These findings are based on the recording time (flow evaluation time). It is not possible with this device to determine a traditional apnea+hypopnea index (AHI) for total sleep time since EEG channels are not available.   2. Nocturnal Hypoxia: O2 Sat. red was 40% and mean O2 sat was 77% and baseline O2 at 92 %. O2 sat was below 90% for 94% of the flow evaluation time. Oxygen Desaturation (>=3%) Index was elevated at 94/hr.       TECHNICAL DESCRIPTION:  ResMed Device used was a type-III home study device. Home sleep study recording included: Airflow recording by nasal pressure transducer; Respiratory Effort by abdominal Respiratory Bands; O2 by finger oximetry. A position sensor and a snore channel was also used.    Scoring Criteria: A modification of the the AASM Manual for the Scoring of Sleep and Associated Events, 2012, was used.   Obstructive apnea was scored by cessation of airflow for at least 10 seconds with continuing respiratory effort.  Central apnea was scored by cessation of airflow for at least 10 seconds with no effort.  Hypopnea was scored by a 30% or more reduction in airflow for at least 10 seconds accompanied by an arterial oxygen desaturation of 3% or more.  (For Medicare patients, hypopneas were scored by a 30% or more reduction in airflow for at least 10 seconds accompanied by an arterial oxygen saturation of 4% or more, as required by their insurance, CMS.      General sleep summary: . Total recording time is 5 hours and 58 minutes and total flow evaluation time is 5 hours and 13 minutes. The patient spent 1 hours and 41 minutes in the  supine position and 2 hours and 14 minutes in the nonsupine position.    Respiratory events:    Apneas: 278 (Obstructive apnea index 48/hr, Central apnea index 1.9 /hr, mixed 2.3 /hour)  Hypopneas: 136    Recommendations:    1. CPAP titration study.   2.   In general patients with sleep apnea are advised to avoid alcohol and sedatives and to not operate a motor vehicle while drowsy. In some cases alternative treatment options may prove effective in resolving sleep apnea in these options include upper airway surgery, the use of a dental orthotic or weight loss and positional therapy. Clinical correlation is required.         Please see the attached report for further details. Thank you for your referral.     Sriram Pitt MD

## 2018-03-10 NOTE — TELEPHONE ENCOUNTER
----- Message from Hakeem Chavarria P.A.-C. sent at 3/9/2018  3:23 PM PST -----  Please have her follow-up with appt to discuss chest x-ray.  Thank you.    Hakeem

## 2018-03-10 NOTE — TELEPHONE ENCOUNTER
----- Message from Hakeem Chavarria P.A.-C. sent at 3/9/2018  3:22 PM PST -----  Please contact Tara. Ultrasound without any concerns expect for it being slightly enlarged.  Awaiting labs.  Thank you.    Hakeem

## 2018-03-10 NOTE — TELEPHONE ENCOUNTER
Phone Number Called: [phone] (home)       Message: Left msg for patient to schedule ov.     Left Message for patient to call back: yes

## 2018-03-12 ENCOUNTER — OFFICE VISIT (OUTPATIENT)
Dept: MEDICAL GROUP | Facility: MEDICAL CENTER | Age: 38
End: 2018-03-12
Payer: COMMERCIAL

## 2018-03-12 VITALS
SYSTOLIC BLOOD PRESSURE: 124 MMHG | DIASTOLIC BLOOD PRESSURE: 86 MMHG | TEMPERATURE: 97.9 F | RESPIRATION RATE: 18 BRPM | HEART RATE: 90 BPM | OXYGEN SATURATION: 94 % | WEIGHT: 293 LBS | BODY MASS INDEX: 48.82 KG/M2 | HEIGHT: 65 IN

## 2018-03-12 DIAGNOSIS — I51.7 ENLARGED HEART CHAMBER: ICD-10-CM

## 2018-03-12 DIAGNOSIS — R05.3 CHRONIC COUGH: ICD-10-CM

## 2018-03-12 DIAGNOSIS — G47.33 OBSTRUCTIVE SLEEP APNEA SYNDROME: ICD-10-CM

## 2018-03-12 DIAGNOSIS — R71.8 LOW MEAN CORPUSCULAR VOLUME (MCV): ICD-10-CM

## 2018-03-12 DIAGNOSIS — E03.8 SUBCLINICAL HYPOTHYROIDISM: ICD-10-CM

## 2018-03-12 DIAGNOSIS — E66.01 MORBID OBESITY WITH BMI OF 50.0-59.9, ADULT (HCC): ICD-10-CM

## 2018-03-12 DIAGNOSIS — M54.41 ACUTE BILATERAL LOW BACK PAIN WITH BILATERAL SCIATICA: ICD-10-CM

## 2018-03-12 DIAGNOSIS — M54.42 ACUTE BILATERAL LOW BACK PAIN WITH BILATERAL SCIATICA: ICD-10-CM

## 2018-03-12 PROCEDURE — 99214 OFFICE O/P EST MOD 30 MIN: CPT | Performed by: PHYSICIAN ASSISTANT

## 2018-03-12 RX ORDER — THYROID 60 MG/1
60 TABLET ORAL DAILY
Qty: 90 TAB | Refills: 3 | Status: SHIPPED | OUTPATIENT
Start: 2018-03-12

## 2018-03-12 NOTE — ASSESSMENT & PLAN NOTE
Impression       1.  Moderate multichamber cardiac enlargement.  2.  Pulmonary vascular congestion without overt pulmonary edema.  3.  No lobar pneumonia or pneumothorax.

## 2018-03-12 NOTE — ASSESSMENT & PLAN NOTE
She states that her low back pain with bilateral sciatica has improved greatly. Was seen Dr. Torres. She had a visit and also was given some exercises to perform. Has no complaints today.

## 2018-03-12 NOTE — ASSESSMENT & PLAN NOTE
She had her sleep study last week. Is awaiting the results. Was told she definitely has sleep apnea.

## 2018-03-12 NOTE — ASSESSMENT & PLAN NOTE
Coughing is still a problem. Chest x-ray showed the following.    Impression       1.  Moderate multichamber cardiac enlargement.  2.  Pulmonary vascular congestion without overt pulmonary edema.  3.  No lobar pneumonia or pneumothorax.     She states she does have lower extremity edema. Denies any orthopnea. Denies any mucus production.

## 2018-03-12 NOTE — PROGRESS NOTES
Subjective:   Tara Romero is a 37 y.o. female here today for acute low back pain with sciatica and chronic cough.    Acute bilateral low back pain with bilateral sciatica  She states that her low back pain with bilateral sciatica has improved greatly. Was seen Dr. Torres. She had a visit and also was given some exercises to perform. Has no complaints today.    Chronic cough  Coughing is still a problem. Chest x-ray showed the following.    Impression       1.  Moderate multichamber cardiac enlargement.  2.  Pulmonary vascular congestion without overt pulmonary edema.  3.  No lobar pneumonia or pneumothorax.     She states she does have lower extremity edema. Denies any orthopnea. Denies any mucus production.    Enlarged heart chamber  Impression       1.  Moderate multichamber cardiac enlargement.  2.  Pulmonary vascular congestion without overt pulmonary edema.  3.  No lobar pneumonia or pneumothorax.         Obstructive sleep apnea syndrome  She had her sleep study last week. Is awaiting the results. Was told she definitely has sleep apnea.    Low mean corpuscular volume (MCV)  Ultrasound was performed that showed multiple fibroids. She continues with heavy menses. MCV was below 80. Hemoglobin within normal limits. No iron or ferritin were drawn. She has appointment to follow up with a surgeon for a hysterectomy.    Morbid obesity with BMI of 50.0-59.9, adult (HCC)  Although weight is unchanged she is losing inches with her waist. She continues to follow-up with weight management. Has changed her diet.    Subclinical hypothyroidism  TSH value was within normal limits. Continues taking [place] thyroid 60 mg daily. Needs a refill.       Current medicines (including changes today)  Current Outpatient Prescriptions   Medication Sig Dispense Refill   • thyroid (ARMOUR THYROID) 60 MG Tab Take 1 Tab by mouth every day. 90 Tab 3   • ibuprofen (MOTRIN) 800 MG Tab Take 1 Tab by mouth every 8 hours as needed. With  "food. 40 Tab 1   • HYDROcodone-acetaminophen (NORCO) 5-325 MG Tab per tablet Take 1 Tab by mouth every 8 hours as needed for up to 14 days. 30 Tab 0   • esterified estrogens-methyltest (ESTRATEST HS) 0.625-1.25 MG Tab Take 1 Tab by mouth every day.     • Blood Glucose Monitoring Suppl Device Meter: Dispense Device of Insurance Preference. Sig. Use as directed for blood sugar monitoring. #1. NR. 1 Device 0   • baclofen (LIORESAL) 20 MG tablet Take 1 Tab by mouth 3 times a day. 60 Tab 0   • metformin (GLUCOPHAGE) 1000 MG tablet Take 1 Tab by mouth 2 times a day, with meals. 60 Tab 3     No current facility-administered medications for this visit.      She  has a past medical history of Hypertension.    Social History and Family History were reviewed and updated.    ROS   No chest pain, no shortness of breath, no abdominal pain and all other systems were reviewed and are negative.       Objective:     Blood pressure 124/86, pulse 90, temperature 36.6 °C (97.9 °F), resp. rate 18, height 1.651 m (5' 5\"), weight (!) 150.6 kg (332 lb), SpO2 94 %, not currently breastfeeding. Body mass index is 55.25 kg/m².   Physical Exam:  Constitutional: Alert, no distress.  Skin: Warm, dry, good turgor, no rashes in visible areas.  Eye: Equal, round and reactive, conjunctiva clear, lids normal.  ENMT: Lips without lesions, good dentition, oropharynx clear.  Neck: Trachea midline, no masses.   Lymph: No cervical or supraclavicular lymphadenopathy  Respiratory: Unlabored respiratory effort, lungs clear to auscultation, no wheezes, no ronchi.  Cardiovascular: Normal S1, S2, no murmur, no edema.  Psych: Alert and oriented x3, normal affect and mood.        Assessment and Plan:   The following treatment plan was discussed    1. Acute bilateral low back pain with bilateral sciatica  Acute condition. Resolved. Continue exercises. Continue follow-up with chiropractic services as needed.    2. Chronic cough  Chronic condition. Concern over chest " x-ray showing pulmonary vascular congestion. Referred to pulmonology. Also ordered PFT. May contact scheduling for an appointment.  - REFERRAL TO PULMONOLOGY  - PULMONARY FUNCTION TESTS Test requested: Complete Pulmonary Function Test    3. Enlarged heart chamber  Acute, new onset condition. Referred to cardiology. Chamber enlargement likely secondary to morbid obesity. Need to rule out other factors as well.  - REFERRAL TO CARDIOLOGY    4. Obstructive sleep apnea syndrome  Chronic condition. Positive for sleep apnea. Follow with sleep center.    5. Low mean corpuscular volume (MCV)  Acute, new onset condition. Symptoms likely secondary to fibroids causing menorrhagia. Ordered iron and ferritin to check for stores. Likely will have her start iron supplement.  - IRON; Future  - FERRITIN; Future    6. Morbid obesity with BMI of 50.0-59.9, adult (HCC)  Chronic condition. Follow-up with dietitian.    7. Subclinical hypothyroidism  Chronic condition. TSH value within normal limits. Likely subclinical state. Renewed [place] Thyroid 60 mg daily.  - thyroid (ARMOUR THYROID) 60 MG Tab; Take 1 Tab by mouth every day.  Dispense: 90 Tab; Refill: 3      Followup: Return in about 4 weeks (around 4/9/2018), or if symptoms worsen or fail to improve.    Please note that this dictation was created using voice recognition software. I have made every reasonable attempt to correct obvious errors, but I expect that there are errors of grammar and possibly content that I did not discover before finalizing the note.

## 2018-03-12 NOTE — ASSESSMENT & PLAN NOTE
Ultrasound was performed that showed multiple fibroids. She continues with heavy menses. MCV was below 80. Hemoglobin within normal limits. No iron or ferritin were drawn. She has appointment to follow up with a surgeon for a hysterectomy.

## 2018-03-12 NOTE — ASSESSMENT & PLAN NOTE
Although weight is unchanged she is losing inches with her waist. She continues to follow-up with weight management. Has changed her diet.

## 2018-03-12 NOTE — ASSESSMENT & PLAN NOTE
TSH value was within normal limits. Continues taking [place] thyroid 60 mg daily. Needs a refill.

## 2018-03-15 ENCOUNTER — OFFICE VISIT (OUTPATIENT)
Dept: CARDIOLOGY | Facility: MEDICAL CENTER | Age: 38
End: 2018-03-15
Payer: COMMERCIAL

## 2018-03-15 ENCOUNTER — HOSPITAL ENCOUNTER (OUTPATIENT)
Dept: LAB | Facility: MEDICAL CENTER | Age: 38
End: 2018-03-15
Attending: PHYSICIAN ASSISTANT
Payer: COMMERCIAL

## 2018-03-15 ENCOUNTER — HOSPITAL ENCOUNTER (OUTPATIENT)
Dept: LAB | Facility: MEDICAL CENTER | Age: 38
End: 2018-03-15
Attending: INTERNAL MEDICINE
Payer: COMMERCIAL

## 2018-03-15 VITALS
HEIGHT: 65 IN | OXYGEN SATURATION: 94 % | HEART RATE: 86 BPM | SYSTOLIC BLOOD PRESSURE: 146 MMHG | RESPIRATION RATE: 16 BRPM | WEIGHT: 293 LBS | BODY MASS INDEX: 48.82 KG/M2 | DIASTOLIC BLOOD PRESSURE: 84 MMHG

## 2018-03-15 DIAGNOSIS — E87.70 HYPERVOLEMIA, UNSPECIFIED HYPERVOLEMIA TYPE: ICD-10-CM

## 2018-03-15 DIAGNOSIS — R06.09 DOE (DYSPNEA ON EXERTION): ICD-10-CM

## 2018-03-15 DIAGNOSIS — R60.0 BILATERAL EDEMA OF LOWER EXTREMITY: ICD-10-CM

## 2018-03-15 DIAGNOSIS — R71.8 LOW MEAN CORPUSCULAR VOLUME (MCV): ICD-10-CM

## 2018-03-15 DIAGNOSIS — I51.7 ENLARGED HEART CHAMBER: ICD-10-CM

## 2018-03-15 LAB
BNP SERPL-MCNC: 144 PG/ML (ref 0–100)
DEPRECATED D DIMER PPP IA-ACNC: <200 NG/ML(D-DU)
EKG IMPRESSION: NORMAL
FERRITIN SERPL-MCNC: 12.2 NG/ML (ref 10–291)
IRON SERPL-MCNC: 30 UG/DL (ref 40–170)

## 2018-03-15 PROCEDURE — 82728 ASSAY OF FERRITIN: CPT

## 2018-03-15 PROCEDURE — 93000 ELECTROCARDIOGRAM COMPLETE: CPT | Performed by: INTERNAL MEDICINE

## 2018-03-15 PROCEDURE — 85379 FIBRIN DEGRADATION QUANT: CPT

## 2018-03-15 PROCEDURE — 99214 OFFICE O/P EST MOD 30 MIN: CPT | Performed by: INTERNAL MEDICINE

## 2018-03-15 PROCEDURE — 36415 COLL VENOUS BLD VENIPUNCTURE: CPT

## 2018-03-15 PROCEDURE — 83540 ASSAY OF IRON: CPT

## 2018-03-15 PROCEDURE — 83880 ASSAY OF NATRIURETIC PEPTIDE: CPT

## 2018-03-15 RX ORDER — FUROSEMIDE 20 MG/1
20 TABLET ORAL DAILY
Qty: 30 TAB | Refills: 11 | Status: SHIPPED | OUTPATIENT
Start: 2018-03-15 | End: 2018-04-10 | Stop reason: SDUPTHER

## 2018-03-15 RX ORDER — POTASSIUM CHLORIDE 20 MEQ/1
40 TABLET, EXTENDED RELEASE ORAL DAILY
Qty: 30 TAB | Refills: 11 | Status: SHIPPED | OUTPATIENT
Start: 2018-03-15 | End: 2018-04-10 | Stop reason: SDUPTHER

## 2018-03-15 ASSESSMENT — ENCOUNTER SYMPTOMS
BACK PAIN: 1
NERVOUS/ANXIOUS: 1
DIZZINESS: 1
COUGH: 1
DIAPHORESIS: 1
HEADACHES: 1
SORE THROAT: 1
SHORTNESS OF BREATH: 1
INSOMNIA: 0
PALPITATIONS: 0
PND: 0
MYALGIAS: 0
FEVER: 0
LOSS OF CONSCIOUSNESS: 0
WEAKNESS: 1
CHILLS: 0
ABDOMINAL PAIN: 0
WHEEZING: 1
BLURRED VISION: 0
ORTHOPNEA: 1

## 2018-03-15 NOTE — LETTER
"     [facility]   [address]  [place] [address]  Phone: [phone]  Fax: [phone]              Tara Romero  1980    Encounter Date: 3/15/2018    Ulises Ayala M.D.          PROGRESS NOTE:  Subjective:   Tara Romero is a 37 y.o. female who presents today referred by her PCP Hakeem Schwarz PA-C for cardiology consultation for evaluation of a \"enlarged heart\".    The patient has developed a nonproductive cough over the past few weeks.  The patient got a chest x-ray which showed a \"enlarged heart\" in addition to interstitial edema which prompted her referral for cardiology consultation.    The patient reports one to two-month history of increasing exertional shortness of breath, orthopnea and lower extremity edema.  She was referred and had a recent sleep study on 3/7/2018 and has a follow-up appointment at the [facility] on 3/29/2018.    The patient has a significant past medical history of intermittent hypertension treated intermittently with medical therapy, diabetes mellitus ×2 years on metformin and hypothyroidism on supplements ×3 years.    The patient has no prior history of heart disease including a history of heart murmur or rheumatic fever.  She's never smoked cigarettes and does not drink alcohol and there is no family history of premature heart disease.    She admits to drinking a lot of water each day because she's been told to do so.    Social history  Single. No children.  Works at [employer] for .    Family history mother alive and has a pacemaker at age 77.  Father  of lung problems and had a history of heart irregularity.    Past Medical History:   Diagnosis Date   • Hypertension      History reviewed. No pertinent surgical history.  Family History   Problem Relation Age of Onset   • Hypertension Mother    • Heart Disease Mother    • Arthritis Mother    • Sleep Apnea Mother    • " Diabetes Father    • Heart Disease Father    • Sleep Apnea Sister    • Sleep Apnea Sister      History   Smoking Status   • Never Smoker   Smokeless Tobacco   • Never Used     Allergies   Allergen Reactions   • Asa [Aspirin] Vomiting and Swelling     Facial swelling     Outpatient Encounter Prescriptions as of 3/15/2018   Medication Sig Dispense Refill   • NON SPECIFIED TAKES BIRTH CONTROL, NOT SURE WHICH ONE.     • furosemide (LASIX) 20 MG Tab Take 1 Tab by mouth every day. 30 Tab 11   • potassium chloride SA (KDUR) 20 MEQ Tab CR Take 2 Tabs by mouth every day. 30 Tab 11   • thyroid (ARMOUR THYROID) 60 MG Tab Take 1 Tab by mouth every day. 90 Tab 3   • ibuprofen (MOTRIN) 800 MG Tab Take 1 Tab by mouth every 8 hours as needed. With food. 40 Tab 1   • Blood Glucose Monitoring Suppl Device Meter: Dispense Device of Insurance Preference. Sig. Use as directed for blood sugar monitoring. #1. NR. 1 Device 0   • baclofen (LIORESAL) 20 MG tablet Take 1 Tab by mouth 3 times a day. 60 Tab 0   • metformin (GLUCOPHAGE) 1000 MG tablet Take 1 Tab by mouth 2 times a day, with meals. 60 Tab 3   • esterified estrogens-methyltest (ESTRATEST HS) 0.625-1.25 MG Tab Take 1 Tab by mouth every day.       No facility-administered encounter medications on file as of 3/15/2018.      Review of Systems   Constitutional: Positive for diaphoresis and malaise/fatigue. Negative for chills and fever.   HENT: Positive for congestion and sore throat.    Eyes: Negative for blurred vision.   Respiratory: Positive for cough, shortness of breath and wheezing.    Cardiovascular: Positive for orthopnea and leg swelling. Negative for chest pain, palpitations and PND.   Gastrointestinal: Negative for abdominal pain.   Genitourinary: Positive for frequency and urgency. Negative for dysuria.   Musculoskeletal: Positive for back pain and joint pain. Negative for myalgias.   Skin: Negative for rash.   Neurological: Positive for dizziness, weakness and headaches.  "Negative for loss of consciousness.   Psychiatric/Behavioral: The patient is nervous/anxious. The patient does not have insomnia.         Objective:   Ht 1.651 m (5' 5\")   Wt (!) 150.6 kg (332 lb)   BMI 55.25 kg/m²      Physical Exam   Constitutional: She is oriented to person, place, and time. She appears well-nourished. No distress.   HENT:   Head: Normocephalic and atraumatic.   Eyes: Conjunctivae and EOM are normal. Pupils are equal, round, and reactive to light. No scleral icterus.   Neck: Carotid bruit is not present. No thyromegaly present.   Neck thick JVP difficult to assess.   Cardiovascular: Normal rate, regular rhythm, S1 normal and S2 normal.  Exam reveals no gallop and no friction rub.    No murmur heard.  Pulses:       Carotid pulses are 2+ on the right side, and 2+ on the left side.       Radial pulses are 2+ on the right side, and 2+ on the left side.        Femoral pulses are 2+ on the right side, and 2+ on the left side.       Posterior tibial pulses are 2+ on the right side, and 2+ on the left side.   No femoral bruits.   Pulmonary/Chest: Effort normal and breath sounds normal. She has no wheezes. She has no rhonchi. She has no rales.   Marked increased AP diameter.   Abdominal: Soft. Bowel sounds are normal. She exhibits no abdominal bruit, no pulsatile midline mass and no mass. There is no hepatosplenomegaly. There is no tenderness.   Markedly protuberant obese.   Musculoskeletal: She exhibits edema.   Lymphadenopathy:     She has no cervical adenopathy.   Neurological: She is alert and oriented to person, place, and time. She has normal strength. Gait normal.   Skin: Skin is warm and dry. No cyanosis. Nails show no clubbing.   Psychiatric: She has a normal mood and affect. Her behavior is normal.       Assessment:     1. Enlarged heart chamber  EKG   2. HOANG (dyspnea on exertion)  B TYPE NATRIURETIC    D-DIMER   3. Bilateral edema of lower extremity  B TYPE NATRIURETIC    D-DIMER   4. " Hypervolemia, unspecified hypervolemia type         Medical Decision Making:  Today's Assessment / Status / Plan:     The patient is clinically volume overloaded, the etiology is unclear as to whether or not this is a primary left heart issue or related to advanced consequences of severe sleep apnea.  Empirically start Lasix 20 mg daily with potassium supplements.  Check d-dimer and BNP.  Echocardiogram.  Instructed to minimize fluid and salt intake.  Follow-up 7-10 days.      Hakeem Chavarria, P.A.-C.  [address]  [address]  [place] [address]  VIA In Basket

## 2018-03-15 NOTE — PROGRESS NOTES
"Subjective:   Tara Romero is a 37 y.o. female who presents today referred by her PCP Hakeem Schwarz PA-C for cardiology consultation for evaluation of a \"enlarged heart\".    The patient has developed a nonproductive cough over the past few weeks.  The patient got a chest x-ray which showed a \"enlarged heart\" in addition to interstitial edema which prompted her referral for cardiology consultation.    The patient reports one to two-month history of increasing exertional shortness of breath, orthopnea and lower extremity edema.  She was referred and had a recent sleep study on 3/7/2018 and has a follow-up appointment at the [facility] on 3/29/2018.    The patient has a significant past medical history of intermittent hypertension treated intermittently with medical therapy, diabetes mellitus ×2 years on metformin and hypothyroidism on supplements ×3 years.    The patient has no prior history of heart disease including a history of heart murmur or rheumatic fever.  She's never smoked cigarettes and does not drink alcohol and there is no family history of premature heart disease.    She admits to drinking a lot of water each day because she's been told to do so.    Social history  Single. No children.  Works at [employer] for .    Family history mother alive and has a pacemaker at age 77.  Father  of lung problems and had a history of heart irregularity.    Past Medical History:   Diagnosis Date   • Hypertension      History reviewed. No pertinent surgical history.  Family History   Problem Relation Age of Onset   • Hypertension Mother    • Heart Disease Mother    • Arthritis Mother    • Sleep Apnea Mother    • Diabetes Father    • Heart Disease Father    • Sleep Apnea Sister    • Sleep Apnea Sister      History   Smoking Status   • Never Smoker   Smokeless Tobacco   • Never Used     Allergies   Allergen Reactions   • Asa [Aspirin] Vomiting and Swelling     Facial swelling " "    Outpatient Encounter Prescriptions as of 3/15/2018   Medication Sig Dispense Refill   • NON SPECIFIED TAKES BIRTH CONTROL, NOT SURE WHICH ONE.     • furosemide (LASIX) 20 MG Tab Take 1 Tab by mouth every day. 30 Tab 11   • potassium chloride SA (KDUR) 20 MEQ Tab CR Take 2 Tabs by mouth every day. 30 Tab 11   • thyroid (ARMOUR THYROID) 60 MG Tab Take 1 Tab by mouth every day. 90 Tab 3   • ibuprofen (MOTRIN) 800 MG Tab Take 1 Tab by mouth every 8 hours as needed. With food. 40 Tab 1   • Blood Glucose Monitoring Suppl Device Meter: Dispense Device of Insurance Preference. Sig. Use as directed for blood sugar monitoring. #1. NR. 1 Device 0   • baclofen (LIORESAL) 20 MG tablet Take 1 Tab by mouth 3 times a day. 60 Tab 0   • metformin (GLUCOPHAGE) 1000 MG tablet Take 1 Tab by mouth 2 times a day, with meals. 60 Tab 3   • esterified estrogens-methyltest (ESTRATEST HS) 0.625-1.25 MG Tab Take 1 Tab by mouth every day.       No facility-administered encounter medications on file as of 3/15/2018.      Review of Systems   Constitutional: Positive for diaphoresis and malaise/fatigue. Negative for chills and fever.   HENT: Positive for congestion and sore throat.    Eyes: Negative for blurred vision.   Respiratory: Positive for cough, shortness of breath and wheezing.    Cardiovascular: Positive for orthopnea and leg swelling. Negative for chest pain, palpitations and PND.   Gastrointestinal: Negative for abdominal pain.   Genitourinary: Positive for frequency and urgency. Negative for dysuria.   Musculoskeletal: Positive for back pain and joint pain. Negative for myalgias.   Skin: Negative for rash.   Neurological: Positive for dizziness, weakness and headaches. Negative for loss of consciousness.   Psychiatric/Behavioral: The patient is nervous/anxious. The patient does not have insomnia.         Objective:   Ht 1.651 m (5' 5\")   Wt (!) 150.6 kg (332 lb)   BMI 55.25 kg/m²     Physical Exam   Constitutional: She is " oriented to person, place, and time. She appears well-nourished. No distress.   HENT:   Head: Normocephalic and atraumatic.   Eyes: Conjunctivae and EOM are normal. Pupils are equal, round, and reactive to light. No scleral icterus.   Neck: Carotid bruit is not present. No thyromegaly present.   Neck thick JVP difficult to assess.   Cardiovascular: Normal rate, regular rhythm, S1 normal and S2 normal.  Exam reveals no gallop and no friction rub.    No murmur heard.  Pulses:       Carotid pulses are 2+ on the right side, and 2+ on the left side.       Radial pulses are 2+ on the right side, and 2+ on the left side.        Femoral pulses are 2+ on the right side, and 2+ on the left side.       Posterior tibial pulses are 2+ on the right side, and 2+ on the left side.   No femoral bruits.   Pulmonary/Chest: Effort normal and breath sounds normal. She has no wheezes. She has no rhonchi. She has no rales.   Marked increased AP diameter.   Abdominal: Soft. Bowel sounds are normal. She exhibits no abdominal bruit, no pulsatile midline mass and no mass. There is no hepatosplenomegaly. There is no tenderness.   Markedly protuberant obese.   Musculoskeletal: She exhibits edema.   Lymphadenopathy:     She has no cervical adenopathy.   Neurological: She is alert and oriented to person, place, and time. She has normal strength. Gait normal.   Skin: Skin is warm and dry. No cyanosis. Nails show no clubbing.   Psychiatric: She has a normal mood and affect. Her behavior is normal.       Assessment:     1. Enlarged heart chamber  EKG   2. HOANG (dyspnea on exertion)  B TYPE NATRIURETIC    D-DIMER   3. Bilateral edema of lower extremity  B TYPE NATRIURETIC    D-DIMER   4. Hypervolemia, unspecified hypervolemia type         Medical Decision Making:  Today's Assessment / Status / Plan:     The patient is clinically volume overloaded, the etiology is unclear as to whether or not this is a primary left heart issue or related to advanced  consequences of severe sleep apnea.  Empirically start Lasix 20 mg daily with potassium supplements.  Check d-dimer and BNP.  Echocardiogram.  Instructed to minimize fluid and salt intake.  Follow-up 7-10 days.

## 2018-03-16 ENCOUNTER — TELEPHONE (OUTPATIENT)
Dept: MEDICAL GROUP | Facility: MEDICAL CENTER | Age: 38
End: 2018-03-16

## 2018-03-16 NOTE — TELEPHONE ENCOUNTER
Phone Number Called: [phone] (home)     Message: Patient was notified of results via [facility]    Left Message for patient to call back: no

## 2018-03-16 NOTE — TELEPHONE ENCOUNTER
----- Message from Hakeem Chavarria P.A.-C. sent at 3/16/2018  7:33 AM PDT -----  Please contact Tara. Iron level was low.  May start an OTC iron supplement and await to have hysterectomy if still an option.  Thank you.    Hakeem

## 2018-03-23 ENCOUNTER — HOSPITAL ENCOUNTER (OUTPATIENT)
Facility: MEDICAL CENTER | Age: 38
End: 2018-03-23
Attending: SPECIALIST
Payer: COMMERCIAL

## 2018-03-23 ENCOUNTER — APPOINTMENT (OUTPATIENT)
Dept: CARDIOLOGY | Facility: MEDICAL CENTER | Age: 38
End: 2018-03-23
Attending: INTERNAL MEDICINE
Payer: COMMERCIAL

## 2018-03-23 PROCEDURE — 88305 TISSUE EXAM BY PATHOLOGIST: CPT

## 2018-03-27 ENCOUNTER — HOSPITAL ENCOUNTER (OUTPATIENT)
Dept: CARDIOLOGY | Facility: MEDICAL CENTER | Age: 38
End: 2018-03-27
Attending: INTERNAL MEDICINE
Payer: COMMERCIAL

## 2018-03-27 DIAGNOSIS — R60.0 BILATERAL EDEMA OF LOWER EXTREMITY: ICD-10-CM

## 2018-03-27 DIAGNOSIS — R06.09 DOE (DYSPNEA ON EXERTION): ICD-10-CM

## 2018-03-27 DIAGNOSIS — I51.7 ENLARGED HEART CHAMBER: ICD-10-CM

## 2018-03-27 LAB
LV EJECT FRACT  99904: 70
LV EJECT FRACT MOD 2C 99903: 54.76
LV EJECT FRACT MOD 4C 99902: 79.67
LV EJECT FRACT MOD BP 99901: 70.12

## 2018-03-27 PROCEDURE — 93306 TTE W/DOPPLER COMPLETE: CPT | Mod: 26 | Performed by: INTERNAL MEDICINE

## 2018-03-27 PROCEDURE — 93306 TTE W/DOPPLER COMPLETE: CPT

## 2018-03-28 ENCOUNTER — OFFICE VISIT (OUTPATIENT)
Dept: CARDIOLOGY | Facility: MEDICAL CENTER | Age: 38
End: 2018-03-28
Payer: COMMERCIAL

## 2018-03-28 VITALS
WEIGHT: 293 LBS | HEART RATE: 88 BPM | RESPIRATION RATE: 16 BRPM | SYSTOLIC BLOOD PRESSURE: 140 MMHG | HEIGHT: 65 IN | BODY MASS INDEX: 48.82 KG/M2 | OXYGEN SATURATION: 92 % | DIASTOLIC BLOOD PRESSURE: 82 MMHG

## 2018-03-28 DIAGNOSIS — R06.09 DOE (DYSPNEA ON EXERTION): ICD-10-CM

## 2018-03-28 DIAGNOSIS — I51.7 ENLARGED HEART CHAMBER: ICD-10-CM

## 2018-03-28 DIAGNOSIS — E66.01 MORBID OBESITY WITH BMI OF 50.0-59.9, ADULT (HCC): ICD-10-CM

## 2018-03-28 DIAGNOSIS — M54.42 ACUTE BILATERAL LOW BACK PAIN WITH BILATERAL SCIATICA: ICD-10-CM

## 2018-03-28 DIAGNOSIS — M54.41 ACUTE BILATERAL LOW BACK PAIN WITH BILATERAL SCIATICA: ICD-10-CM

## 2018-03-28 PROCEDURE — 99214 OFFICE O/P EST MOD 30 MIN: CPT | Performed by: INTERNAL MEDICINE

## 2018-03-28 ASSESSMENT — ENCOUNTER SYMPTOMS
CHILLS: 0
ORTHOPNEA: 1
LOSS OF CONSCIOUSNESS: 0
INSOMNIA: 0
PALPITATIONS: 0
SORE THROAT: 1
DIAPHORESIS: 1
WEAKNESS: 1
COUGH: 1
ABDOMINAL PAIN: 0
FEVER: 0
NERVOUS/ANXIOUS: 1
MYALGIAS: 0
HEADACHES: 1
BLURRED VISION: 0
DIZZINESS: 1
WHEEZING: 1
SHORTNESS OF BREATH: 1
BACK PAIN: 1
PND: 0

## 2018-03-28 NOTE — PROGRESS NOTES
"Chief Complaint   Patient presents with   • Results       Subjective:   Tara Patterson is a 38 y.o. female who presents today for follow-up evaluation of an abnormal chest x-ray showing enlarged heart, shortness of breath.    Last seen on 3/15/2018.    Since 3/18/2015 the patient has had no new symptoms.  Lasix did not help with fluid retention.  Denies chest pain or worsening shortness of breath.  Has a follow-up with the sleep study which showed severe obstructive sleep apnea.    Past medical history  The patient has developed a nonproductive cough over the past few weeks.  The patient got a chest x-ray which showed a \"enlarged heart\" in addition to interstitial edema which prompted her referral for cardiology consultation.     The patient reports one to two-month history of increasing exertional shortness of breath, orthopnea and lower extremity edema.  She was referred and had a recent sleep study on 3/7/2018 and has [facility] on 3[facility]29/2018.     The patient has a significant past medical history of intermittent hypertension treated intermittently with medical therapy, diabetes mellitus ×2 years on metformin and hypothyroidism on supplements ×3 years.     The patient has no prior history of heart disease including a history of heart murmur or rheumatic fever.  She's never smoked cigarettes and does not drink alcohol and there is no family history of premature heart disease.     She admits to drinking a lot of water each day because she's been told to do so.     Social history  Single. No children.  Works at [employer] for .     Family history mother alive and has a pacemaker at age 77.  Father  of lung problems and had a history of heart irregularity.       Past Medical History:   Diagnosis Date   • Hypertension      History reviewed. No pertinent surgical history.  Family History   Problem Relation Age of Onset   • Hypertension Mother    • Heart " Disease Mother    • Arthritis Mother    • Sleep Apnea Mother    • Diabetes Father    • Heart Disease Father    • Sleep Apnea Sister    • Sleep Apnea Sister      Social History     Social History   • Marital status: Single     Spouse name: N/A   • Number of children: N/A   • Years of education: N/A     Occupational History   • Not on file.     Social History Main Topics   • Smoking status: Never Smoker   • Smokeless tobacco: Never Used   • Alcohol use No   • Drug use: No   • Sexual activity: Yes     Partners: Male     Other Topics Concern   • Not on file     Social History Narrative   • No narrative on file     Allergies   Allergen Reactions   • Asa [Aspirin] Vomiting and Swelling     Facial swelling     Outpatient Encounter Prescriptions as of 3/28/2018   Medication Sig Dispense Refill   • NON SPECIFIED TAKES BIRTH CONTROL, NOT SURE WHICH ONE.     • furosemide (LASIX) 20 MG Tab Take 1 Tab by mouth every day. 30 Tab 11   • potassium chloride SA (KDUR) 20 MEQ Tab CR Take 2 Tabs by mouth every day. 30 Tab 11   • thyroid (ARMOUR THYROID) 60 MG Tab Take 1 Tab by mouth every day. 90 Tab 3   • ibuprofen (MOTRIN) 800 MG Tab Take 1 Tab by mouth every 8 hours as needed. With food. 40 Tab 1   • Blood Glucose Monitoring Suppl Device Meter: Dispense Device of Insurance Preference. Sig. Use as directed for blood sugar monitoring. #1. NR. 1 Device 0   • baclofen (LIORESAL) 20 MG tablet Take 1 Tab by mouth 3 times a day. (Patient taking differently: Take 20 mg by mouth 3 times a day. PRN) 60 Tab 0   • metformin (GLUCOPHAGE) 1000 MG tablet Take 1 Tab by mouth 2 times a day, with meals. 60 Tab 3   • esterified estrogens-methyltest (ESTRATEST HS) 0.625-1.25 MG Tab Take 1 Tab by mouth every day.       No facility-administered encounter medications on file as of 3/28/2018.      Review of Systems   Constitutional: Positive for diaphoresis and malaise/fatigue. Negative for chills and fever.   HENT: Positive for congestion and sore throat.  "   Eyes: Negative for blurred vision.   Respiratory: Positive for cough, shortness of breath and wheezing.    Cardiovascular: Positive for orthopnea and leg swelling. Negative for chest pain, palpitations and PND.   Gastrointestinal: Negative for abdominal pain.   Genitourinary: Positive for frequency and urgency. Negative for dysuria.   Musculoskeletal: Positive for back pain and joint pain. Negative for myalgias.   Skin: Negative for rash.   Neurological: Positive for dizziness, weakness and headaches. Negative for loss of consciousness.   Psychiatric/Behavioral: The patient is nervous/anxious. The patient does not have insomnia.         Objective:   /82   Pulse 88   Resp 16   Ht 1.651 m (5' 5\")   Wt (!) 151 kg (333 lb)   SpO2 92%   BMI 55.41 kg/m²     Physical Exam   Constitutional: She is oriented to person, place, and time.   HENT:   Head: Normocephalic and atraumatic.   Neck: No thyromegaly present.   Thick neck JVP difficult to assess.   Cardiovascular: Normal rate, regular rhythm, normal heart sounds and intact distal pulses.    No murmur heard.  Pulmonary/Chest: Effort normal and breath sounds normal. No respiratory distress. She has no wheezes. She has no rales.   Abdominal:   Markedly protruded.   Musculoskeletal: She exhibits edema.   Mild pedal edema.   Lymphadenopathy:     She has no cervical adenopathy.   Neurological: She is alert and oriented to person, place, and time.   Skin: Skin is warm and dry.   Psychiatric: She has a normal mood and affect. Her behavior is normal.     ECHOCARDIOGRAM 03/27/2018  Normal left ventricular systolic function.  Left ventricular ejection fraction is visually estimated to be 70%.  Mild concentric left ventricular hypertrophy.  No significant valve abnormalities.   Unable to estimate pulmonary artery pressure due to an inadequate   tricuspid regurgitant jet.    EKG 03/15/2018 Normal sinus rhythm, rate 81. Reviewed by myself.    Assessment:     1. Enlarged " heart chamber     2. HOANG (dyspnea on exertion)     3. Morbid obesity with BMI of 50.0-59.9, adult ([facility])         Medical Decision Making:  Today's Assessment / Status / Plan:     I reviewed with the patient results of her echocardiogram which demonstrates normal heart size is an normal left ventricular ejection fraction of 70%.  I reviewed her laboratory tests with her.  It appears that her symptoms of shortness of breath and fluid retention are directly related to her significant morbid obesity which the patient recognizes is a serious health problem and is initiating lifestyle and dietary changes in order to accomplish weight loss.  She has a follow-up at the sleep Center for review of her sleep study and initiate treatment for her sleep apnea.  I have no further diagnostic or therapeutic cardiac recommendations at this time.  She can stop her diuretics.  If he have any additional questions with regards to her cardiac condition please don't hesitate to contact me.

## 2018-03-28 NOTE — LETTER
"     [facility]   [address]  [place] [address]  Phone: [phone]  Fax: [phone]              Tara Patterson  1980    Encounter Date: 3/28/2018    Ulises Ayala M.D.          PROGRESS NOTE:  Chief Complaint   Patient presents with   • Results       Subjective:   Tara Patterson is a 38 y.o. female who presents today for follow-up evaluation of an abnormal chest x-ray showing enlarged heart, shortness of breath.    Last seen on 3/15/2018.    Since 3/18/2015 the patient has had no new symptoms.  Lasix did not help with fluid retention.  Denies chest pain or worsening shortness of breath.  Has a follow-up with the sleep study which showed severe obstructive sleep apnea.    Past medical history  The patient has developed a nonproductive cough over the past few weeks.  The patient got a chest x-ray which showed a \"enlarged heart\" in addition to interstitial edema which prompted her referral for cardiology consultation.     The patient reports one to two-month history of increasing exertional shortness of breath, orthopnea and lower extremity edema.  She was referred and had a recent sleep study on 3/7/2018 and has a follow-up appointment at the [facility] on 3/29/2018.     The patient has a significant past medical history of intermittent hypertension treated intermittently with medical therapy, diabetes mellitus ×2 years on metformin and hypothyroidism on supplements ×3 years.     The patient has no prior history of heart disease including a history of heart murmur or rheumatic fever.  She's never smoked cigarettes and does not drink alcohol and there is no family history of premature heart disease.     She admits to drinking a lot of water each day because she's been told to do so.     Social history  Single. No children.  Works at [employer] for .     Family history mother alive and has a pacemaker at age " 77.  Father  of lung problems and had a history of heart irregularity.       Past Medical History:   Diagnosis Date   • Hypertension      History reviewed. No pertinent surgical history.  Family History   Problem Relation Age of Onset   • Hypertension Mother    • Heart Disease Mother    • Arthritis Mother    • Sleep Apnea Mother    • Diabetes Father    • Heart Disease Father    • Sleep Apnea Sister    • Sleep Apnea Sister      Social History     Social History   • Marital status: Single     Spouse name: N/A   • Number of children: N/A   • Years of education: N/A     Occupational History   • Not on file.     Social History Main Topics   • Smoking status: Never Smoker   • Smokeless tobacco: Never Used   • Alcohol use No   • Drug use: No   • Sexual activity: Yes     Partners: Male     Other Topics Concern   • Not on file     Social History Narrative   • No narrative on file     Allergies   Allergen Reactions   • Asa [Aspirin] Vomiting and Swelling     Facial swelling     Outpatient Encounter Prescriptions as of 3/28/2018   Medication Sig Dispense Refill   • NON SPECIFIED TAKES BIRTH CONTROL, NOT SURE WHICH ONE.     • furosemide (LASIX) 20 MG Tab Take 1 Tab by mouth every day. 30 Tab 11   • potassium chloride SA (KDUR) 20 MEQ Tab CR Take 2 Tabs by mouth every day. 30 Tab 11   • thyroid (ARMOUR THYROID) 60 MG Tab Take 1 Tab by mouth every day. 90 Tab 3   • ibuprofen (MOTRIN) 800 MG Tab Take 1 Tab by mouth every 8 hours as needed. With food. 40 Tab 1   • Blood Glucose Monitoring Suppl Device Meter: Dispense Device of Insurance Preference. Sig. Use as directed for blood sugar monitoring. #1. NR. 1 Device 0   • baclofen (LIORESAL) 20 MG tablet Take 1 Tab by mouth 3 times a day. (Patient taking differently: Take 20 mg by mouth 3 times a day. PRN) 60 Tab 0   • metformin (GLUCOPHAGE) 1000 MG tablet Take 1 Tab by mouth 2 times a day, with meals. 60 Tab 3   • esterified estrogens-methyltest (ESTRATEST HS) 0.625-1.25 MG Tab  "Take 1 Tab by mouth every day.       No facility-administered encounter medications on file as of 3/28/2018.      Review of Systems   Constitutional: Positive for diaphoresis and malaise/fatigue. Negative for chills and fever.   HENT: Positive for congestion and sore throat.    Eyes: Negative for blurred vision.   Respiratory: Positive for cough, shortness of breath and wheezing.    Cardiovascular: Positive for orthopnea and leg swelling. Negative for chest pain, palpitations and PND.   Gastrointestinal: Negative for abdominal pain.   Genitourinary: Positive for frequency and urgency. Negative for dysuria.   Musculoskeletal: Positive for back pain and joint pain. Negative for myalgias.   Skin: Negative for rash.   Neurological: Positive for dizziness, weakness and headaches. Negative for loss of consciousness.   Psychiatric/Behavioral: The patient is nervous/anxious. The patient does not have insomnia.         Objective:   /82   Pulse 88   Resp 16   Ht 1.651 m (5' 5\")   Wt (!) 151 kg (333 lb)   SpO2 92%   BMI 55.41 kg/m²      Physical Exam   Constitutional: She is oriented to person, place, and time.   HENT:   Head: Normocephalic and atraumatic.   Neck: No thyromegaly present.   Thick neck JVP difficult to assess.   Cardiovascular: Normal rate, regular rhythm, normal heart sounds and intact distal pulses.    No murmur heard.  Pulmonary/Chest: Effort normal and breath sounds normal. No respiratory distress. She has no wheezes. She has no rales.   Abdominal:   Markedly protruded.   Musculoskeletal: She exhibits edema.   Mild pedal edema.   Lymphadenopathy:     She has no cervical adenopathy.   Neurological: She is alert and oriented to person, place, and time.   Skin: Skin is warm and dry.   Psychiatric: She has a normal mood and affect. Her behavior is normal.     ECHOCARDIOGRAM 03/27/2018  Normal left ventricular systolic function.  Left ventricular ejection fraction is visually estimated to be " 70%.  Mild concentric left ventricular hypertrophy.  No significant valve abnormalities.   Unable to estimate pulmonary artery pressure due to an inadequate   tricuspid regurgitant jet.    EKG 03/15/2018 Normal sinus rhythm, rate 81. Reviewed by myself.    Assessment:     1. Enlarged heart chamber     2. HOANG (dyspnea on exertion)     3. Morbid obesity with BMI of 50.0-59.9, adult ([facility])         Medical Decision Making:  Today's Assessment / Status / Plan:     I reviewed with the patient results of her echocardiogram which demonstrates normal heart size is an normal left ventricular ejection fraction of 70%.  I reviewed her laboratory tests with her.  It appears that her symptoms of shortness of breath and fluid retention are directly related to her significant morbid obesity which the patient recognizes is a serious health problem and is initiating lifestyle and dietary changes in order to accomplish weight loss.  She has a follow-up at the sleep Center for review of her sleep study and initiate treatment for her sleep apnea.  I have no further diagnostic or therapeutic cardiac recommendations at this time.  She can stop her diuretics.  If he have any additional questions with regards to her cardiac condition please don't hesitate to contact me.      Hakeem Chavarria, P.A.-C.  [address]  [address]  [facility] [address]  VIA In Basket

## 2018-03-29 ENCOUNTER — SLEEP CENTER VISIT (OUTPATIENT)
Dept: SLEEP MEDICINE | Facility: MEDICAL CENTER | Age: 38
End: 2018-03-29
Payer: COMMERCIAL

## 2018-03-29 VITALS
RESPIRATION RATE: 15 BRPM | WEIGHT: 293 LBS | HEART RATE: 105 BPM | SYSTOLIC BLOOD PRESSURE: 134 MMHG | BODY MASS INDEX: 48.82 KG/M2 | DIASTOLIC BLOOD PRESSURE: 84 MMHG | OXYGEN SATURATION: 91 % | HEIGHT: 65 IN

## 2018-03-29 DIAGNOSIS — M54.41 ACUTE BILATERAL LOW BACK PAIN WITH BILATERAL SCIATICA: ICD-10-CM

## 2018-03-29 DIAGNOSIS — E66.01 MORBID OBESITY WITH BMI OF 50.0-59.9, ADULT (HCC): ICD-10-CM

## 2018-03-29 DIAGNOSIS — G47.33 OBSTRUCTIVE SLEEP APNEA SYNDROME: ICD-10-CM

## 2018-03-29 DIAGNOSIS — M54.42 ACUTE BILATERAL LOW BACK PAIN WITH BILATERAL SCIATICA: ICD-10-CM

## 2018-03-29 PROCEDURE — 99213 OFFICE O/P EST LOW 20 MIN: CPT | Performed by: NURSE PRACTITIONER

## 2018-03-29 RX ORDER — ZOLPIDEM TARTRATE 5 MG/1
5 TABLET ORAL NIGHTLY PRN
Qty: 3 TAB | Refills: 0 | Status: SHIPPED | OUTPATIENT
Start: 2018-03-29 | End: 2018-04-02

## 2018-03-29 NOTE — LETTER
March 29, 2018         Patient: Tara Patterson   YOB: 1980   Date of Visit: 3/29/2018           To Whom it May Concern:    Tara Patterson was seen in my clinic on 3/29/2018.     If you have any questions or concerns, please don't hesitate to call.        Sincerely,           EDUARDA Foote  Electronically Signed

## 2018-03-29 NOTE — PROGRESS NOTES
Chief Complaint   Patient presents with   • Results     HST         HPI: This patient is a 38 y.o. female, who presents for home sleep study results.     She was seen in consultation for evaluation of DAMASO February 27, 2018.Sleep symptoms include snoring, gasping, choking, witnessed apneas, EDS.    Home sleep study indicates very severe obstructive sleep apnea with an AHI of 79, minimum oxygen saturation of 40 %.     Patient reports significant allergies and postnasal drip.    Past Medical History:   Diagnosis Date   • Hypertension        Social History   Substance Use Topics   • Smoking status: Never Smoker   • Smokeless tobacco: Never Used   • Alcohol use No       Family History   Problem Relation Age of Onset   • Hypertension Mother    • Heart Disease Mother    • Arthritis Mother    • Sleep Apnea Mother    • Diabetes Father    • Heart Disease Father    • Sleep Apnea Sister    • Sleep Apnea Sister        Current medications as of today   Current Outpatient Prescriptions   Medication Sig Dispense Refill   • zolpidem (AMBIEN) 5 MG Tab Take 1 Tab by mouth at bedtime as needed for Sleep for up to 3 doses. 3 Tab 0   • NON SPECIFIED TAKES BIRTH CONTROL, NOT SURE WHICH ONE.     • furosemide (LASIX) 20 MG Tab Take 1 Tab by mouth every day. 30 Tab 11   • potassium chloride SA (KDUR) 20 MEQ Tab CR Take 2 Tabs by mouth every day. 30 Tab 11   • thyroid (ARMOUR THYROID) 60 MG Tab Take 1 Tab by mouth every day. 90 Tab 3   • ibuprofen (MOTRIN) 800 MG Tab Take 1 Tab by mouth every 8 hours as needed. With food. 40 Tab 1   • Blood Glucose Monitoring Suppl Device Meter: Dispense Device of Insurance Preference. Sig. Use as directed for blood sugar monitoring. #1. NR. 1 Device 0   • baclofen (LIORESAL) 20 MG tablet Take 1 Tab by mouth 3 times a day. (Patient taking differently: Take 20 mg by mouth 3 times a day. PRN) 60 Tab 0   • metformin (GLUCOPHAGE) 1000 MG tablet Take 1 Tab by mouth 2 times a day, with meals. 60 Tab 3   •  "esterified estrogens-methyltest (ESTRATEST HS) 0.625-1.25 MG Tab Take 1 Tab by mouth every day.       No current facility-administered medications for this visit.        Allergies: Asa [aspirin]    Blood pressure 134/84, pulse (!) 105, resp. rate 15, height 1.651 m (5' 5\"), weight (!) 151 kg (333 lb), SpO2 91 %.      ROS: As per HPI and otherwise negative if not stated.      Physical exam:   Constitutional: Obese, in no acute distress  Eyes: PERRL  Neck: supple, trachea midline  Respiratory: no intercostal retractions or accessory muscle use   Lungs auscultation: Clear to auscultation bilaterally, diminished bilateral bases  Cardiovascular: Regular rate rhythm no murmurs, rubs or gallops  Musculoskeletal: no clubbing or cyanosis  Skin: No rashes or lesions noted on exposed skin  Neuro: No focal deficit noted  Psychiatric: Oriented to time, person and place.     Diagnosis:  1. Morbid obesity with BMI of 50.0-59.9, adult (HCC)     2. Obstructive sleep apnea syndrome  POLYSOMNOGRAPHY TITRATION    zolpidem (AMBIEN) 5 MG Tab       Plan:  Testing reviewed in detail with the patient.I reviewed with the patient the pathophysiology of obstructive sleep apnea, as well as potential cardiac and neurologic risks associated with untreated sleep apnea. We reviewed treatment options including CPAP/BiPAP therapy, ENT referral, dental appliance. Given the severity of her sleep apnea CPAP is recommended    1. Titration study ASAP  2. Rx for Ambien provided for the night of titration  3. Continue Zyrtec, add Flonase for postnasal drip  4. Follow-up here after titration to review results    "

## 2018-04-10 ENCOUNTER — OFFICE VISIT (OUTPATIENT)
Dept: MEDICAL GROUP | Facility: MEDICAL CENTER | Age: 38
End: 2018-04-10
Payer: COMMERCIAL

## 2018-04-10 VITALS
WEIGHT: 293 LBS | HEART RATE: 102 BPM | HEIGHT: 65 IN | BODY MASS INDEX: 48.82 KG/M2 | OXYGEN SATURATION: 93 % | DIASTOLIC BLOOD PRESSURE: 88 MMHG | SYSTOLIC BLOOD PRESSURE: 144 MMHG | TEMPERATURE: 98.3 F

## 2018-04-10 DIAGNOSIS — E66.01 MORBID OBESITY WITH BMI OF 50.0-59.9, ADULT (HCC): ICD-10-CM

## 2018-04-10 DIAGNOSIS — M54.42 ACUTE BILATERAL LOW BACK PAIN WITH BILATERAL SCIATICA: ICD-10-CM

## 2018-04-10 DIAGNOSIS — J31.0 CHRONIC RHINITIS, UNSPECIFIED TYPE: ICD-10-CM

## 2018-04-10 DIAGNOSIS — G47.33 OBSTRUCTIVE SLEEP APNEA SYNDROME: ICD-10-CM

## 2018-04-10 DIAGNOSIS — N92.1 MENORRHAGIA WITH IRREGULAR CYCLE: ICD-10-CM

## 2018-04-10 DIAGNOSIS — R05.3 CHRONIC COUGH: ICD-10-CM

## 2018-04-10 DIAGNOSIS — M54.41 ACUTE BILATERAL LOW BACK PAIN WITH BILATERAL SCIATICA: ICD-10-CM

## 2018-04-10 DIAGNOSIS — R60.0 BILATERAL EDEMA OF LOWER EXTREMITY: ICD-10-CM

## 2018-04-10 PROCEDURE — 99214 OFFICE O/P EST MOD 30 MIN: CPT | Performed by: PHYSICIAN ASSISTANT

## 2018-04-10 RX ORDER — FUROSEMIDE 20 MG/1
20 TABLET ORAL DAILY
Qty: 30 TAB | Refills: 11 | Status: SHIPPED | OUTPATIENT
Start: 2018-04-10 | End: 2018-07-12

## 2018-04-10 RX ORDER — POTASSIUM CHLORIDE 20 MEQ/1
40 TABLET, EXTENDED RELEASE ORAL DAILY
Qty: 30 TAB | Refills: 11 | Status: SHIPPED | OUTPATIENT
Start: 2018-04-10 | End: 2018-07-12

## 2018-04-10 RX ORDER — FLUTICASONE PROPIONATE 50 MCG
1 SPRAY, SUSPENSION (ML) NASAL 2 TIMES DAILY
Qty: 16 G | Refills: 5 | Status: SHIPPED | OUTPATIENT
Start: 2018-04-10

## 2018-04-10 NOTE — ASSESSMENT & PLAN NOTE
Continues to cough. Complains as well of waking up in the morning with mucus which she coughs up. It is yellow. Has been going on since November. She attributed this to allergies. No previous allergies. Uses Flonase as well as takes Claritin. No relief. Flonase is used in the morning only.

## 2018-04-10 NOTE — ASSESSMENT & PLAN NOTE
Continues with low back pain with left-sided sciatica. Is playing phone tag with physiatry. Has been seeing chiropractic services but there is nothing more that can be done because her symptoms are not improving.

## 2018-04-10 NOTE — ASSESSMENT & PLAN NOTE
This is a 38-year-old female who returns today to discuss her lower extremity edema. She followed up with cardiology. Heart condition of enlarged heart as secondary to her body habitus. She was told to discontinue Lasix and potassium. Complains of worsening swelling over the past week. Associated discomfort. Cardiology advised her to lose weight. She having difficulty losing weight. Medications provided by her medical dietary provider her too expensive.

## 2018-04-10 NOTE — PROGRESS NOTES
Subjective:   Tara Patterson is a 38 y.o. female here today for bilateral lower extremity swelling and acute left-sided low back pain with sciatica.    Bilateral edema of lower extremity  This is a 38-year-old female who returns today to discuss her lower extremity edema. She followed up with cardiology. Heart condition of enlarged heart as secondary to her body habitus. She was told to discontinue Lasix and potassium. Complains of worsening swelling over the past week. Associated discomfort. Cardiology advised her to lose weight. She having difficulty losing weight. Medications provided by her medical dietary provider her too expensive.    Acute bilateral low back pain with bilateral sciatica  Continues with low back pain with left-sided sciatica. Is playing phone tag with physiatry. Has been seeing chiropractic services but there is nothing more that can be done because her symptoms are not improving.    Menorrhagia with irregular cycle  Has an appointment in July for a hysterectomy.    Morbid obesity with BMI of 50.0-59.9, adult (HCC)  Unfortunately she is unable to lose significant weight. Weight is stable nevertheless.    Obstructive sleep apnea syndrome  Has an appointment in the next week with pulmonology for sleep study. Denies follow-up appointments.    Chronic cough  Continues to cough. Complains as well of waking up in the morning with mucus which she coughs up. It is yellow. Has been going on since November. She attributed this to allergies. No previous allergies. Uses Flonase as well as takes Claritin. No relief. Flonase is used in the morning only.       Current medicines (including changes today)  Current Outpatient Prescriptions   Medication Sig Dispense Refill   • furosemide (LASIX) 20 MG Tab Take 1 Tab by mouth every day. 30 Tab 11   • potassium chloride SA (KDUR) 20 MEQ Tab CR Take 2 Tabs by mouth every day. 30 Tab 11   • fluticasone (FLONASE) 50 MCG/ACT nasal spray Spray 1 Spray in nose 2 times a  "day. 16 g 5   • thyroid (ARMOUR THYROID) 60 MG Tab Take 1 Tab by mouth every day. 90 Tab 3   • ibuprofen (MOTRIN) 800 MG Tab Take 1 Tab by mouth every 8 hours as needed. With food. 40 Tab 1   • esterified estrogens-methyltest (ESTRATEST HS) 0.625-1.25 MG Tab Take 1 Tab by mouth every day.     • Blood Glucose Monitoring Suppl Device Meter: Dispense Device of Insurance Preference. Sig. Use as directed for blood sugar monitoring. #1. NR. 1 Device 0   • metformin (GLUCOPHAGE) 1000 MG tablet Take 1 Tab by mouth 2 times a day, with meals. 60 Tab 3   • NON SPECIFIED TAKES BIRTH CONTROL, NOT SURE WHICH ONE.     • baclofen (LIORESAL) 20 MG tablet Take 1 Tab by mouth 3 times a day. (Patient taking differently: Take 20 mg by mouth 3 times a day. PRN) 60 Tab 0     No current facility-administered medications for this visit.      She  has a past medical history of Hypertension.    Social History and Family History were reviewed and updated.    ROS   No chest pain, no shortness of breath, no abdominal pain and all other systems were reviewed and are negative.       Objective:     Blood pressure 144/88, pulse (!) 102, temperature 36.8 °C (98.3 °F), height 1.651 m (5' 5\"), weight (!) 151.8 kg (334 lb 10.5 oz), last menstrual period 03/29/2018, SpO2 93 %. Body mass index is 55.69 kg/m².   Physical Exam:  Constitutional: Alert, no distress.  Skin: Warm, dry, good turgor, no rashes in visible areas.  Eye: Equal, round and reactive, conjunctiva clear, lids normal.  ENMT: Lips without lesions, good dentition, oropharynx clear.  Neck: Trachea midline, no masses.   Lymph: No cervical or supraclavicular lymphadenopathy  Respiratory: Unlabored respiratory effort, lungs appear clear, no wheezes.  Cardiovascular: Normal S1, S2, no murmur, no edema.  Psych: Alert and oriented x3, normal affect and mood.        Assessment and Plan:   The following treatment plan was discussed    1. Bilateral edema of lower extremity  Chronic condition. Symptoms " likely secondary to body habitus. Placed her back on Lasix and potassium to help alleviate her symptoms. Follow up in one month to see if there are any changes. Urged to continue to exercise routinely and eat healthier.  - furosemide (LASIX) 20 MG Tab; Take 1 Tab by mouth every day.  Dispense: 30 Tab; Refill: 11  - potassium chloride SA (KDUR) 20 MEQ Tab CR; Take 2 Tabs by mouth every day.  Dispense: 30 Tab; Refill: 11    2. Acute bilateral low back pain with bilateral sciatica  Acute, new onset condition. Urged to follow-up with physiatry. Also follow up with x-ray that was ordered.    3. Chronic cough  Chronic condition. Now appears symptoms maybe secondary to postnasal drainage. Provided prescription for Flonase to use as directed. Use twice daily. Change antihistamine to another brand such as Allegra or Zyrtec.    4. Chronic rhinitis, unspecified type  Chronic condition. Likely the cause of her coughing is not related to GERD. Provided Flonase as directed.  - fluticasone (FLONASE) 50 MCG/ACT nasal spray; Spray 1 Spray in nose 2 times a day.  Dispense: 16 g; Refill: 5    5. Menorrhagia with irregular cycle  Chronic condition. Follow-up with hysterectomy in the summer.    6. Morbid obesity with BMI of 50.0-59.9, adult (HCC)  Chronic condition. Stable. First exercise more routinely because of the weather change.    7. Obstructive sleep apnea syndrome  Chronic condition. Follow-up with pulmonology for testing later in the month.      Followup: Return in about 4 weeks (around 5/8/2018), or if symptoms worsen or fail to improve.    Please note that this dictation was created using voice recognition software. I have made every reasonable attempt to correct obvious errors, but I expect that there are errors of grammar and possibly content that I did not discover before finalizing the note.

## 2018-04-10 NOTE — ASSESSMENT & PLAN NOTE
Has an appointment in the next week with pulmonology for sleep study. Denies follow-up appointments.

## 2018-04-12 ENCOUNTER — HOSPITAL ENCOUNTER (EMERGENCY)
Facility: MEDICAL CENTER | Age: 38
End: 2018-04-12
Attending: EMERGENCY MEDICINE
Payer: COMMERCIAL

## 2018-04-12 ENCOUNTER — APPOINTMENT (OUTPATIENT)
Dept: RADIOLOGY | Facility: MEDICAL CENTER | Age: 38
End: 2018-04-12
Attending: EMERGENCY MEDICINE
Payer: COMMERCIAL

## 2018-04-12 VITALS
SYSTOLIC BLOOD PRESSURE: 197 MMHG | RESPIRATION RATE: 18 BRPM | TEMPERATURE: 98 F | BODY MASS INDEX: 48.82 KG/M2 | DIASTOLIC BLOOD PRESSURE: 99 MMHG | OXYGEN SATURATION: 95 % | HEART RATE: 86 BPM | WEIGHT: 293 LBS | HEIGHT: 65 IN

## 2018-04-12 DIAGNOSIS — M54.42 ACUTE BILATERAL LOW BACK PAIN WITH BILATERAL SCIATICA: ICD-10-CM

## 2018-04-12 DIAGNOSIS — M54.41 ACUTE BILATERAL LOW BACK PAIN WITH BILATERAL SCIATICA: ICD-10-CM

## 2018-04-12 LAB
APPEARANCE UR: ABNORMAL
BACTERIA #/AREA URNS HPF: ABNORMAL /HPF
BILIRUB UR QL STRIP.AUTO: NEGATIVE
COLOR UR: YELLOW
CULTURE IF INDICATED INDCX: NO UA CULTURE
EPI CELLS #/AREA URNS HPF: ABNORMAL /HPF
GLUCOSE UR STRIP.AUTO-MCNC: NEGATIVE MG/DL
HCG UR QL: NEGATIVE
KETONES UR STRIP.AUTO-MCNC: NEGATIVE MG/DL
LEUKOCYTE ESTERASE UR QL STRIP.AUTO: NEGATIVE
MICRO URNS: ABNORMAL
MUCOUS THREADS #/AREA URNS HPF: ABNORMAL /HPF
NITRITE UR QL STRIP.AUTO: NEGATIVE
PH UR STRIP.AUTO: 6 [PH]
PROT UR QL STRIP: 30 MG/DL
RBC # URNS HPF: ABNORMAL /HPF
RBC UR QL AUTO: ABNORMAL
SP GR UR REFRACTOMETRY: 1.02
SP GR UR STRIP.AUTO: 1.02
WBC #/AREA URNS HPF: ABNORMAL /HPF

## 2018-04-12 PROCEDURE — 700111 HCHG RX REV CODE 636 W/ 250 OVERRIDE (IP): Performed by: EMERGENCY MEDICINE

## 2018-04-12 PROCEDURE — 99284 EMERGENCY DEPT VISIT MOD MDM: CPT

## 2018-04-12 PROCEDURE — 700111 HCHG RX REV CODE 636 W/ 250 OVERRIDE (IP)

## 2018-04-12 PROCEDURE — 81025 URINE PREGNANCY TEST: CPT

## 2018-04-12 PROCEDURE — 72100 X-RAY EXAM L-S SPINE 2/3 VWS: CPT

## 2018-04-12 PROCEDURE — 81001 URINALYSIS AUTO W/SCOPE: CPT

## 2018-04-12 PROCEDURE — A9270 NON-COVERED ITEM OR SERVICE: HCPCS | Performed by: EMERGENCY MEDICINE

## 2018-04-12 PROCEDURE — 96372 THER/PROPH/DIAG INJ SC/IM: CPT

## 2018-04-12 PROCEDURE — 700102 HCHG RX REV CODE 250 W/ 637 OVERRIDE(OP): Performed by: EMERGENCY MEDICINE

## 2018-04-12 RX ORDER — KETOROLAC TROMETHAMINE 30 MG/ML
30 INJECTION, SOLUTION INTRAMUSCULAR; INTRAVENOUS ONCE
Status: COMPLETED | OUTPATIENT
Start: 2018-04-12 | End: 2018-04-12

## 2018-04-12 RX ORDER — KETOROLAC TROMETHAMINE 30 MG/ML
INJECTION, SOLUTION INTRAMUSCULAR; INTRAVENOUS
Status: COMPLETED
Start: 2018-04-12 | End: 2018-04-12

## 2018-04-12 RX ORDER — HYDROCODONE BITARTRATE AND ACETAMINOPHEN 5; 325 MG/1; MG/1
1-2 TABLET ORAL EVERY 4 HOURS PRN
Qty: 12 TAB | Refills: 0 | Status: SHIPPED | OUTPATIENT
Start: 2018-04-12 | End: 2018-04-17 | Stop reason: SDUPTHER

## 2018-04-12 RX ORDER — HYDROMORPHONE HYDROCHLORIDE 2 MG/ML
1.5 INJECTION, SOLUTION INTRAMUSCULAR; INTRAVENOUS; SUBCUTANEOUS ONCE
Status: COMPLETED | OUTPATIENT
Start: 2018-04-12 | End: 2018-04-12

## 2018-04-12 RX ORDER — NAPROXEN 500 MG/1
500 TABLET ORAL 2 TIMES DAILY WITH MEALS
Qty: 60 TAB | Refills: 0 | Status: SHIPPED | OUTPATIENT
Start: 2018-04-12 | End: 2018-05-25 | Stop reason: SDUPTHER

## 2018-04-12 RX ORDER — DIAZEPAM 5 MG/1
5 TABLET ORAL ONCE
Status: COMPLETED | OUTPATIENT
Start: 2018-04-12 | End: 2018-04-12

## 2018-04-12 RX ADMIN — KETOROLAC TROMETHAMINE 30 MG: 30 INJECTION, SOLUTION INTRAMUSCULAR; INTRAVENOUS at 20:30

## 2018-04-12 RX ADMIN — KETOROLAC TROMETHAMINE 30 MG: 30 INJECTION, SOLUTION INTRAMUSCULAR at 20:30

## 2018-04-12 RX ADMIN — HYDROMORPHONE HYDROCHLORIDE 1.5 MG: 2 INJECTION, SOLUTION INTRAMUSCULAR; INTRAVENOUS; SUBCUTANEOUS at 20:30

## 2018-04-12 RX ADMIN — DIAZEPAM 5 MG: 5 TABLET ORAL at 22:11

## 2018-04-12 ASSESSMENT — PAIN SCALES - GENERAL: PAINLEVEL_OUTOF10: 10

## 2018-04-13 NOTE — ED NOTES
Patient ambulated in for back pain weeks that shoots down both legs. Patient seen at PCP for this and xray ordered. Patient hasn't gotten xray yet. Patient states she takes norco for this but it no longer works.

## 2018-04-13 NOTE — ED PROVIDER NOTES
"ED Provider Note    CHIEF COMPLAINT  Chief Complaint   Patient presents with   • Back Pain       HPI  Tara Patterson is a 38 y.o. Female with history of DM, HTN, PCOS, and obesity, who presents with 3-4 week history of lower back pain.  Endorses sharp, severe pain in lumbar area which radiates down both extremities, more on the left than right. Reports pain is worse with movement, she is unable to obtain comfortable position.  Denies numbness or weakness in lower extremities. No bowel or bladder incontinence.  Has had some recent diarrhea followed by constipation. She endorses some urinary urgency however no dysuria or hematuria. Denies recent history of falls or trauma. She states she has been taking Norco with less relief over the last 3 days.  Patient was seen by her PCP yesterday and was ordered for XR however never had it done.  She endorses history of prior diverticulosis as well as kidney stones, however no prior abdominal surgeries. Denies chance of pregnancy.      REVIEW OF SYSTEMS  See HPI for further details. All other systems are negative.     PAST MEDICAL HISTORY   has a past medical history of Diverticulosis; DM (diabetes mellitus) (CMS-HCC); Hypertension; and PCOS (polycystic ovarian syndrome).    SOCIAL HISTORY  Social History     Social History Main Topics   • Smoking status: Never Smoker   • Smokeless tobacco: Never Used   • Alcohol use No   • Drug use: No   • Sexual activity: Yes     Partners: Male       SURGICAL HISTORY  patient denies any surgical history    CURRENT MEDICATIONS  Home Medications    **Home medications have not yet been reviewed for this encounter**         ALLERGIES  Allergies   Allergen Reactions   • Asa [Aspirin] Vomiting and Swelling     Facial swelling, patient takes ibuprofen at home       PHYSICAL EXAM  VITAL SIGNS: BP (!) 197/99   Pulse 86   Temp 36.7 °C (98 °F)   Resp 18   Ht 1.651 m (5' 5\")   Wt (!) 154.3 kg (340 lb 2.7 oz)   LMP 03/29/2018   SpO2 95%   BMI 56.61 " "kg/m²    Constitutional: Obese female. Alert, nontoxic appearing, uncomfortable due to pain.  HENT: Normocephalic, Atraumatic. Bilateral external ears normal. Nose normal. Moist mucous membranes.  Neck: Supple, full range of motion.  Eyes: Pupils are equal and reactive. Conjunctiva normal.   Heart: Regular rate and rhythm. No murmurs.    Lungs: No respiratory distress.  Normal work of breathing.  Clear to auscultation bilaterally.  Abdomen:  Soft, no distention. No tenderness to palpation  Skin: Warm, Dry. No rash.   Musculoskeletal: Atraumatic, no deformities noted. No midline C/T/L-spine tenderness to palpation  Neurologic: Alert and oriented. Moving all extremities spontaneously.  Positive straight leg raise test on the right. Unable to elicit patellar DTRs. Strength 5 out of 5 throughout lower extremities, sensation intact, normal gait.  Psychiatric: Affect normal, Mood normal. Appears appropriate and not intoxicated.       DIAGNOSTIC STUDIES      LABS  Personally reviewed by me  Labs Reviewed   URINALYSIS,CULTURE IF INDICATED - Abnormal; Notable for the following:        Result Value    Character Hazy (*)     Protein 30 (*)     Occult Blood Large (*)     All other components within normal limits   URINE MICROSCOPIC (W/UA) - Abnormal; Notable for the following:     -150 (*)     Bacteria Few (*)     Epithelial Cells Moderate (*)     All other components within normal limits   HCG QUALITATIVE UR   REFRACTOMETER SG         RADIOLOGY  Personally reviewed by me  DX-LUMBAR SPINE-2 OR 3 VIEWS   Final Result      Unremarkable lumbar spine.          ED COURSE  Vitals:    04/12/18 1949 04/12/18 2251   BP: (!) 181/112 (!) 197/99   Pulse: 97 86   Resp: 18 18   Temp: 37 °C (98.6 °F) 36.7 °C (98 °F)   SpO2: 95%    Weight: (!) 154.3 kg (340 lb 2.7 oz)    Height: 1.651 m (5' 5\")          Medications administered:  Medications   HYDROmorphone (DILAUDID) injection 1.5 mg (1.5 mg Intramuscular Given 4/12/18 2030) "   ketorolac (TORADOL) injection 30 mg (30 mg Intramuscular Given 4/12/18 2030)   diazePAM (VALIUM) tablet 5 mg (5 mg Oral Given 4/12/18 2211)         Old records personally reviewed:  Reviewed patient's . Last and only scheduled prescription was for [place] in February 2018.  She was seen by her primary care physician 2 days prior and referred to physiatry for acute sciatica.        MEDICAL DECISION MAKING  Patient is obese female with history of hypertension, diabetes, and PCO S2 presents with 3-4 week history of lower back pain with associated radiculopathy. She is hypertensive on arrival with otherwise normal vital signs and nontoxic appearing. No history of recent falls or trauma.  X-ray negative for fracture or malalignment. No fevers or infectious symptoms concerning for epidural abscess. She has no red flag signs or symptoms for cauda equina syndrome. Patient has some urinary urgency without evidence of UTI. She has evidence of blood in her urine which is likely due to her irregular menstruation. Clinically doubt nephrolithiasis. Patient will be treated for her symptoms here in the department and likely discharged home.    11:14 PM - Upon reassessment, patient is resting comfortably with persistent hypertension however otherwise normal vital signs.  No new complaints at this time, she appears much more comfortable.  Discussed results with patient and/or family as well as importance of primary care and physical therapy follow up.  Patient understands plan of care and strict return precautions for new or changing symptoms.         IMPRESSION  (M54.42,  M54.41) Acute bilateral low back pain with bilateral sciatica    Disposition: Discharge home  Results, diagnoses, and treatment options were discussed with the patient and/or family. Patient verbalized understanding of plan of care and strict return precautions prior to discharge.    Patient referred to primary care provider for monitoring and treatment of blood  pressure.      New Prescriptions    HYDROCODONE-ACETAMINOPHEN (NORCO) 5-325 MG TAB PER TABLET    Take 1-2 Tabs by mouth every four hours as needed for up to 7 days.    NAPROXEN (NAPROSYN) 500 MG TAB    Take 1 Tab by mouth 2 times a day, with meals.     In prescribing controlled substances to this patient, I certify that I have obtained and reviewed the medical history of Tara Patterson. I have also made a good agnieszka effort to obtain applicable records from other providers who have treated the patient and records did not demonstrate any increased risk of substance abuse that would prevent me from prescribing controlled substances.     I have conducted a physical exam and documented it. I have reviewed Ms. Patterson’s prescription history as maintained by the [place] Prescription Monitoring Program.     I have assessed the patient’s risk for abuse, dependency, and addiction using the validated Opioid Risk Tool available at https://www.mdcalc.com/dkarkd-bfzq-wvdi-ort-narcotic-abuse.     Given the above, I believe the benefits of controlled substance therapy outweigh the risks. The reasons for prescribing controlled substances include non-narcotic, oral analgesic alternatives have been inadequate for pain control. Accordingly, I have discussed the risk and benefits, treatment plan, and alternative therapies with the patient.         Electronically signed by: Francia Knowles, 4/12/2018 8:11 PM

## 2018-04-13 NOTE — DISCHARGE INSTRUCTIONS
You were seen in the Emergency Department for back pain.    Xrays and urine were completed without significant acute abnormalities.    Please take naproxen scheduled morning and night for inflammation. Please use Norco as needed for severe pain. Do not drink alcohol or drive while using this medication.    Please follow up with your primary care physician as soon as possible for primary care referral.    Return to the Emergency Department with fevers, uncontrolled pain, bowel or bladder incontinence, numbness or weakness in extremities, or other concerns.      Back Pain, Adult  Back pain is very common in adults. The cause of back pain is rarely dangerous and the pain often gets better over time. The cause of your back pain may not be known. Some common causes of back pain include:  · Strain of the muscles or ligaments supporting the spine.  · Wear and tear (degeneration) of the spinal disks.  · Arthritis.  · Direct injury to the back.  For many people, back pain may return. Since back pain is rarely dangerous, most people can learn to manage this condition on their own.  Follow these instructions at home:  Watch your back pain for any changes. The following actions may help to lessen any discomfort you are feeling:  · Remain active. It is stressful on your back to sit or  one place for long periods of time. Do not sit, drive, or  one place for more than 30 minutes at a time. Take short walks on even surfaces as soon as you are able. Try to increase the length of time you walk each day.  · Exercise regularly as directed by your health care provider. Exercise helps your back heal faster. It also helps avoid future injury by keeping your muscles strong and flexible.  · Do not stay in bed. Resting more than 1-2 days can delay your recovery.  · Pay attention to your body when you bend and lift. The most comfortable positions are those that put less stress on your recovering back. Always use proper  lifting techniques, including:  ¨ Bending your knees.  ¨ Keeping the load close to your body.  ¨ Avoiding twisting.  · Find a comfortable position to sleep. Use a firm mattress and lie on your side with your knees slightly bent. If you lie on your back, put a pillow under your knees.  · Avoid feeling anxious or stressed. Stress increases muscle tension and can worsen back pain. It is important to recognize when you are anxious or stressed and learn ways to manage it, such as with exercise.  · Take medicines only as directed by your health care provider. Over-the-counter medicines to reduce pain and inflammation are often the most helpful. Your health care provider may prescribe muscle relaxant drugs. These medicines help dull your pain so you can more quickly return to your normal activities and healthy exercise.  · Apply ice to the injured area:  ¨ Put ice in a plastic bag.  ¨ Place a towel between your skin and the bag.  ¨ Leave the ice on for 20 minutes, 2-3 times a day for the first 2-3 days. After that, ice and heat may be alternated to reduce pain and spasms.  · Maintain a healthy weight. Excess weight puts extra stress on your back and makes it difficult to maintain good posture.  Contact a health care provider if:  · You have pain that is not relieved with rest or medicine.  · You have increasing pain going down into the legs or buttocks.  · You have pain that does not improve in one week.  · You have night pain.  · You lose weight.  · You have a fever or chills.  Get help right away if:  · You develop new bowel or bladder control problems.  · You have unusual weakness or numbness in your arms or legs.  · You develop nausea or vomiting.  · You develop abdominal pain.  · You feel faint.  This information is not intended to replace advice given to you by your health care provider. Make sure you discuss any questions you have with your health care provider.  Document Released: 12/18/2006 Document Revised:  04/27/2017 Document Reviewed: 04/21/2015  [employer] Interactive Patient Education © 2017 [employer]      Back Exercises  Back exercises help treat and prevent back injuries. The goal is to increase your strength in your belly (abdominal) and back muscles. These exercises can also help with flexibility. Start these exercises when told by your doctor.  HOME CARE  Back exercises include:  Pelvic Tilt.  · Lie on your back with your knees bent. Tilt your pelvis until the lower part of your back is against the floor. Hold this position 5 to 10 sec. Repeat this exercise 5 to 10 times.  Knee to Chest.  · Pull 1 knee up against your chest and hold for 20 to 30 seconds. Repeat this with the other knee. This may be done with the other leg straight or bent, whichever feels better. Then, pull both knees up against your chest.  Sit-Ups or Curl-Ups.  · Bend your knees 90 degrees. Start with tilting your pelvis, and do a partial, slow sit-up. Only lift your upper half 30 to 45 degrees off the floor. Take at least 2 to 3 seonds for each sit-up. Do not do sit-ups with your knees out straight. If partial sit-ups are difficult, simply do the above but with only tightening your belly (abdominal) muscles and holding it as told.  Hip-Lift.  · Lie on your back with your knees flexed 90 degrees. Push down with your feet and shoulders as you raise your hips 2 inches off the floor. Hold for 10 seconds, repeat 5 to 10 times.  Back Arches.  · Lie on your stomach. Prop yourself up on bent elbows. Slowly press on your hands, causing an arch in your low back. Repeat 3 to 5 times.  Shoulder-Lifts.  · Lie face down with arms beside your body. Keep hips and belly pressed to floor as you slowly lift your head and shoulders off the floor.  Do not overdo your exercises. Be careful in the beginning. Exercises may cause you some mild back discomfort. If the pain lasts for more than 15 minutes, stop the exercises until you see your doctor. Improvement  with exercise for back problems is slow.      This information is not intended to replace advice given to you by your health care provider. Make sure you discuss any questions you have with your health care provider.     Document Released: 01/20/2012 Document Revised: 03/11/2013 Document Reviewed: 02/11/2016  [employer] Interactive Patient Education ©2016 [employer]      Sciatica  Sciatica is pain, numbness, weakness, or tingling along the path of the sciatic nerve. The sciatic nerve starts in the lower back and runs down the back of each leg. The nerve controls the muscles in the lower leg and in the back of the knee. It also provides feeling (sensation) to the back of the thigh, the lower leg, and the sole of the foot. Sciatica is a symptom of another medical condition that pinches or puts pressure on the sciatic nerve.  Generally, sciatica only affects one side of the body. Sciatica usually goes away on its own or with treatment. In some cases, sciatica may keep coming back (recur).  What are the causes?  This condition is caused by pressure on the sciatic nerve, or pinching of the sciatic nerve. This may be the result of:  · A disk in between the bones of the spine (vertebrae) bulging out too far (herniated disk).  · Age-related changes in the spinal disks (degenerative disk disease).  · A pain disorder that affects a muscle in the buttock (piriformis syndrome).  · Extra bone growth (bone spur) near the sciatic nerve.  · An injury or break (fracture) of the pelvis.  · Pregnancy.  · Tumor (rare).  What increases the risk?  The following factors may make you more likely to develop this condition:  · Playing sports that place pressure or stress on the spine, such as football or weight lifting.  · Having poor strength and flexibility.  · A history of back injury.  · A history of back surgery.  · Sitting for long periods of time.  · Doing activities that involve repetitive bending or lifting.  · Obesity.  What are  the signs or symptoms?  Symptoms can vary from mild to very severe, and they may include:  · Any of these problems in the lower back, leg, hip, or buttock:  ¨ Mild tingling or dull aches.  ¨ Burning sensations.  ¨ Sharp pains.  · Numbness in the back of the calf or the sole of the foot.  · Leg weakness.  · Severe back pain that makes movement difficult.  These symptoms may get worse when you cough, sneeze, or laugh, or when you sit or stand for long periods of time. Being overweight may also make symptoms worse. In some cases, symptoms may recur over time.  How is this diagnosed?  This condition may be diagnosed based on:  · Your symptoms.  · A physical exam. Your health care provider may ask you to do certain movements to check whether those movements trigger your symptoms.  · You may have tests, including:  ¨ Blood tests.  ¨ X-rays.  ¨ MRI.  ¨ CT scan.  How is this treated?  In many cases, this condition improves on its own, without any treatment. However, treatment may include:  · Reducing or modifying physical activity during periods of pain.  · Exercising and stretching to strengthen your abdomen and improve the flexibility of your spine.  · Icing and applying heat to the affected area.  · Medicines that help:  ¨ To relieve pain and swelling.  ¨ To relax your muscles.  · Injections of medicines that help to relieve pain, irritation, and inflammation around the sciatic nerve (steroids).  · Surgery.  Follow these instructions at home:  Medicines  · Take over-the-counter and prescription medicines only as told by your health care provider.  · Do not drive or operate heavy machinery while taking prescription pain medicine.  Managing pain  · If directed, apply ice to the affected area.  ¨ Put ice in a plastic bag.  ¨ Place a towel between your skin and the bag.  ¨ Leave the ice on for 20 minutes, 2-3 times a day.  · After icing, apply heat to the affected area before you exercise or as often as told by your health  care provider. Use the heat source that your health care provider recommends, such as a moist heat pack or a heating pad.  ¨ Place a towel between your skin and the heat source.  ¨ Leave the heat on for 20-30 minutes.  ¨ Remove the heat if your skin turns bright red. This is especially important if you are unable to feel pain, heat, or cold. You may have a greater risk of getting burned.  Activity  · Return to your normal activities as told by your health care provider. Ask your health care provider what activities are safe for you.  ¨ Avoid activities that make your symptoms worse.  · Take brief periods of rest throughout the day. Resting in a lying or standing position is usually better than sitting to rest.  ¨ When you rest for longer periods, mix in some mild activity or stretching between periods of rest. This will help to prevent stiffness and pain.  ¨ Avoid sitting for long periods of time without moving. Get up and move around at least one time each hour.  · Exercise and stretch regularly, as told by your health care provider.  · Do not lift anything that is heavier than 10 lb (4.5 kg) while you have symptoms of sciatica. When you do not have symptoms, you should still avoid heavy lifting, especially repetitive heavy lifting.  · When you lift objects, always use proper lifting technique, which includes:  ¨ Bending your knees.  ¨ Keeping the load close to your body.  ¨ Avoiding twisting.  General instructions  · Use good posture.  ¨ Avoid leaning forward while sitting.  ¨ Avoid hunching over while standing.  · Maintain a healthy weight. Excess weight puts extra stress on your back and makes it difficult to maintain good posture.  · Wear supportive, comfortable shoes. Avoid wearing high heels.  · Avoid sleeping on a mattress that is too soft or too hard. A mattress that is firm enough to support your back when you sleep may help to reduce your pain.  · Keep all follow-up visits as told by your health care  provider. This is important.  Contact a health care provider if:  · You have pain that wakes you up when you are sleeping.  · You have pain that gets worse when you lie down.  · Your pain is worse than you have experienced in the past.  · Your pain lasts longer than 4 weeks.  · You experience unexplained weight loss.  Get help right away if:  · You lose control of your bowel or bladder (incontinence).  · You have:  ¨ Weakness in your lower back, pelvis, buttocks, or legs that gets worse.  ¨ Redness or swelling of your back.  ¨ A burning sensation when you urinate.  This information is not intended to replace advice given to you by your health care provider. Make sure you discuss any questions you have with your health care provider.  Document Released: 12/12/2002 Document Revised: 05/23/2017 Document Reviewed: 08/26/2016  [employer] Interactive Patient Education © 2017 [employer]

## 2018-04-13 NOTE — ED NOTES
.Pt D/C to home. VSS. D/C instructions and prescriptions given to patient. Pt verbalizes understanding. Pt leaves ED with no acute changes, complaints or concerns. Pt ambulated out with a steady gait and all belongings.   .Pt signed Controlled Substance Use Informed Consent and consent placed on pt chart.

## 2018-04-17 ENCOUNTER — OFFICE VISIT (OUTPATIENT)
Dept: MEDICAL GROUP | Facility: MEDICAL CENTER | Age: 38
End: 2018-04-17
Payer: COMMERCIAL

## 2018-04-17 VITALS
HEART RATE: 91 BPM | WEIGHT: 293 LBS | TEMPERATURE: 98.7 F | HEIGHT: 65 IN | DIASTOLIC BLOOD PRESSURE: 100 MMHG | SYSTOLIC BLOOD PRESSURE: 160 MMHG | OXYGEN SATURATION: 96 % | BODY MASS INDEX: 48.82 KG/M2

## 2018-04-17 DIAGNOSIS — M54.41 ACUTE BILATERAL LOW BACK PAIN WITH BILATERAL SCIATICA: ICD-10-CM

## 2018-04-17 DIAGNOSIS — N92.1 MENORRHAGIA WITH IRREGULAR CYCLE: ICD-10-CM

## 2018-04-17 DIAGNOSIS — M54.41 ACUTE RIGHT-SIDED LOW BACK PAIN WITH RIGHT-SIDED SCIATICA: ICD-10-CM

## 2018-04-17 DIAGNOSIS — M54.42 ACUTE BILATERAL LOW BACK PAIN WITH BILATERAL SCIATICA: ICD-10-CM

## 2018-04-17 DIAGNOSIS — G47.33 OBSTRUCTIVE SLEEP APNEA SYNDROME: ICD-10-CM

## 2018-04-17 PROCEDURE — 99214 OFFICE O/P EST MOD 30 MIN: CPT | Performed by: PHYSICIAN ASSISTANT

## 2018-04-17 RX ORDER — HYDROCODONE BITARTRATE AND ACETAMINOPHEN 5; 325 MG/1; MG/1
1 TABLET ORAL
Qty: 30 TAB | Refills: 0 | Status: SHIPPED | OUTPATIENT
Start: 2018-04-17 | End: 2018-05-25 | Stop reason: SDUPTHER

## 2018-04-17 RX ORDER — BACLOFEN 20 MG/1
20 TABLET ORAL 3 TIMES DAILY
Qty: 30 TAB | Refills: 1 | Status: SHIPPED | OUTPATIENT
Start: 2018-04-17 | End: 2018-05-25 | Stop reason: SDUPTHER

## 2018-04-17 RX ORDER — HYDROCODONE BITARTRATE AND ACETAMINOPHEN 5; 325 MG/1; MG/1
1 TABLET ORAL
Qty: 30 TAB | Refills: 0 | Status: SHIPPED | OUTPATIENT
Start: 2018-04-17 | End: 2018-04-17 | Stop reason: SDUPTHER

## 2018-04-17 NOTE — LETTER
April 17, 2018         Patient: Tara Patterson   YOB: 1980   Date of Visit: 4/17/2018           To Whom it May Concern:    Tara Patterson was seen in my clinic on 4/17/2018. She may return to work on 4/18/2018.  Please forgive for missed days on 4/12, 4/13 and 4/16/2018.      If you have any questions or concerns, please don't hesitate to call. Thank you.        Sincerely,           Hakeem Chavarria P.A.-C.  Electronically Signed

## 2018-04-18 ENCOUNTER — OFFICE VISIT (OUTPATIENT)
Dept: PHYSICAL MEDICINE AND REHAB | Facility: MEDICAL CENTER | Age: 38
End: 2018-04-18
Payer: COMMERCIAL

## 2018-04-18 VITALS
WEIGHT: 293 LBS | BODY MASS INDEX: 48.82 KG/M2 | HEIGHT: 65 IN | SYSTOLIC BLOOD PRESSURE: 128 MMHG | TEMPERATURE: 98.2 F | HEART RATE: 97 BPM | DIASTOLIC BLOOD PRESSURE: 98 MMHG | OXYGEN SATURATION: 90 %

## 2018-04-18 DIAGNOSIS — F41.9 ANXIETY: ICD-10-CM

## 2018-04-18 DIAGNOSIS — M54.42 ACUTE BILATERAL LOW BACK PAIN WITH BILATERAL SCIATICA: ICD-10-CM

## 2018-04-18 DIAGNOSIS — Z86.39 H/O VITAMIN D DEFICIENCY: ICD-10-CM

## 2018-04-18 DIAGNOSIS — M54.41 ACUTE BILATERAL LOW BACK PAIN WITH BILATERAL SCIATICA: ICD-10-CM

## 2018-04-18 PROCEDURE — 99204 OFFICE O/P NEW MOD 45 MIN: CPT | Performed by: PHYSICAL MEDICINE & REHABILITATION

## 2018-04-18 RX ORDER — DIAZEPAM 5 MG/1
5 TABLET ORAL
Qty: 1 TAB | Refills: 0 | Status: SHIPPED | OUTPATIENT
Start: 2018-04-18 | End: 2018-04-18

## 2018-04-18 RX ORDER — ESTAZOLAM 2 MG/1
TABLET ORAL
COMMUNITY
Start: 2018-04-07 | End: 2018-05-25

## 2018-04-18 ASSESSMENT — PAIN SCALES - GENERAL: PAINLEVEL: 5=MODERATE PAIN

## 2018-04-18 ASSESSMENT — ENCOUNTER SYMPTOMS
SHORTNESS OF BREATH: 1
CONSTITUTIONAL NEGATIVE: 1
BACK PAIN: 1
GASTROINTESTINAL NEGATIVE: 1
CARDIOVASCULAR NEGATIVE: 1
EYES NEGATIVE: 1
PSYCHIATRIC NEGATIVE: 1
SENSORY CHANGE: 1

## 2018-04-18 NOTE — ASSESSMENT & PLAN NOTE
This is a 38-year-old female who is here today to discuss her low back pain. Typically sciatica is on the left side but when he gets worse bilaterally. She states that over the weekend her pain got worse so she went to the ED. X-ray of her lumbar spine was unremarkable. She was given a shot of Toradol. She was provided Norco. She states to give her relief at nighttime when her pain is the worst she will take a hydrocodone and take baclofen. That will give her Today to sleep. During the daytime she will take naproxen and Tylenol as needed. She has an appointment tomorrow to see physiatry and Dr. yeh.

## 2018-04-18 NOTE — PROGRESS NOTES
Subjective:   Tara Patterson is a 38 y.o. female here today for low back pain with bilateral sciatica for almost 2 months.    Acute bilateral low back pain with bilateral sciatica  This is a 38-year-old female who is here today to discuss her low back pain. Typically sciatica is on the left side but when he gets worse bilaterally. She states that over the weekend her pain got worse so she went to the ED. X-ray of her lumbar spine was unremarkable. She was given a shot of Toradol. She was provided Norco. She states to give her relief at nighttime when her pain is the worst she will take a hydrocodone and take baclofen. That will give her Today to sleep. During the daytime she will take naproxen and Tylenol as needed. She has an appointment tomorrow to see physiatry and Dr. yeh.    Obstructive sleep apnea syndrome  She has an appointment soon with sleep study. She states that her daytime falling asleep at work has improved.    Menorrhagia with irregular cycle  She also states she has an appointment in near future with gynecology to discuss her hysterectomy in July.       Current medicines (including changes today)  Current Outpatient Prescriptions   Medication Sig Dispense Refill   • baclofen (LIORESAL) 20 MG tablet Take 1 Tab by mouth 3 times a day. 30 Tab 1   • HYDROcodone-acetaminophen (NORCO) 5-325 MG Tab per tablet Take 1 Tab by mouth 1 time daily as needed for up to 30 days. 30 Tab 0   • naproxen (NAPROSYN) 500 MG Tab Take 1 Tab by mouth 2 times a day, with meals. 60 Tab 0   • furosemide (LASIX) 20 MG Tab Take 1 Tab by mouth every day. 30 Tab 11   • potassium chloride SA (KDUR) 20 MEQ Tab CR Take 2 Tabs by mouth every day. 30 Tab 11   • thyroid (ARMOUR THYROID) 60 MG Tab Take 1 Tab by mouth every day. 90 Tab 3   • esterified estrogens-methyltest (ESTRATEST HS) 0.625-1.25 MG Tab Take 1 Tab by mouth every day.     • metformin (GLUCOPHAGE) 1000 MG tablet Take 1 Tab by mouth 2 times a day, with meals. 60 Tab 3   •  "fluticasone (FLONASE) 50 MCG/ACT nasal spray Spray 1 Spray in nose 2 times a day. 16 g 5   • NON SPECIFIED TAKES BIRTH CONTROL, NOT SURE WHICH ONE.     • Blood Glucose Monitoring Suppl Device Meter: Dispense Device of Insurance Preference. Sig. Use as directed for blood sugar monitoring. #1. NR. 1 Device 0     No current facility-administered medications for this visit.      She  has a past medical history of Diverticulosis; DM (diabetes mellitus) (CMS-[facility]); Hypertension; and PCOS (polycystic ovarian syndrome).    Social History and Family History were reviewed and updated.    ROS   No chest pain, no shortness of breath, no abdominal pain and all other systems were reviewed and are negative.       Objective:     Blood pressure 160/100, pulse 91, temperature 37.1 °C (98.7 °F), height 1.651 m (5' 5\"), weight (!) 152.2 kg (335 lb 8.6 oz), last menstrual period 03/29/2018, SpO2 96 %. Body mass index is 55.84 kg/m².   Physical Exam:  Constitutional: Alert, no distress.  Skin: Warm, dry, good turgor, no rashes in visible areas.  Eye: Equal, round and reactive, conjunctiva clear, lids normal.  ENMT: Lips without lesions, good dentition, oropharynx clear.  Neck: Trachea midline, no masses.   Lymph: No cervical or supraclavicular lymphadenopathy  Respiratory: Unlabored respiratory effort, lungs appear clear, no wheezes.  Cardiovascular: Normal S1, S2, no murmur, no edema.  Psych: Alert and oriented x3, normal affect and mood.        Assessment and Plan:   The following treatment plan was discussed    1. Acute bilateral low back pain with bilateral sciatica  Acute, new onset condition. Follow up tomorrow with physiatry.  reviewed. Provided renewal prescription for Norco 5 mg tablets to take once daily. Also renew baclofen to take one tablet as needed daily.  - baclofen (LIORESAL) 20 MG tablet; Take 1 Tab by mouth 3 times a day.  Dispense: 30 Tab; Refill: 1  - HYDROcodone-acetaminophen (NORCO) 5-325 MG Tab per tablet; Take " 1 Tab by mouth 1 time daily as needed for up to 30 days.  Dispense: 30 Tab; Refill: 0  - REFERRAL TO PHYSIATRY (PMR)    2. Obstructive sleep apnea syndrome  Chronic condition. Follow up a sleep study in 6 days.    3. Menorrhagia with irregular cycle  Chronic condition. Follow-up with gynecology shortly.    Followup: Return if symptoms worsen or fail to improve.    Please note that this dictation was created using voice recognition software. I have made every reasonable attempt to correct obvious errors, but I expect that there are errors of grammar and possibly content that I did not discover before finalizing the note.

## 2018-04-18 NOTE — ASSESSMENT & PLAN NOTE
She has an appointment soon with sleep study. She states that her daytime falling asleep at work has improved.

## 2018-04-18 NOTE — PROGRESS NOTES
"New patient note    Physiatry (physical medicine and  Rehabilitation), interventional spine and sports medicine, Pain medicine    Date of Service: 4/18/2018    Chief complaint:   Low back and bilateral leg pain    HISTORY    HPI: Tara Patterson 38 y.o. female who presents today for evaluation of low back pain that started about 2 months ago.  She reports that she started having symptoms without any particular injury.  The symptoms include radicular, sharp pains into the legs.  She reports that her feet are numb most of the time.  It sounds like she has been having increased swelling in her feet for the last few months as well.     Sitting can make her symptoms worse as can walking.  Side-bending and walking upstairs are difficult.  It sounds like she has been having trouble with getting winded easily.      Thusfar, she has not tried physical therapy.  She did go to the chiropractor and this did not help much.  Her job involves .         Medical records review:  I reviewed the note from the referring provider Hakeem Chavarria P.A.     Previous treatments:    Physical Therapy: No     Medications the patient is tried: NSAIDs, Narcotics and tylenol      Previous interventions: none    Previous surgeries to relieve the above pain:  none    ROS:   Red Flags ROS:   Fever, Chills, Sweats: Reports chills this week  Involuntary Weight Loss: Denies  Bladder Incontinence: Denies, note of bladder urgency since starting \"water pill\"  Bowel Incontinence: Denies  Saddle Anesthesia: Denies    Review of Systems   Constitutional: Negative.    HENT: Negative.    Eyes: Negative.    Respiratory: Positive for shortness of breath.    Cardiovascular: Negative.    Gastrointestinal: Negative.    Genitourinary: Negative.    Musculoskeletal: Positive for back pain.   Skin: Negative.    Neurological: Positive for sensory change.   Endo/Heme/Allergies:        Thyroid, DM   Psychiatric/Behavioral: Negative.        PMHx:   Past Medical " History:   Diagnosis Date   • Diverticulosis    • DM (diabetes mellitus) (CMS-HCC)    • Hypertension    • PCOS (polycystic ovarian syndrome)        PSHx:  History reviewed. No pertinent surgical history.    Family history  Family History   Problem Relation Age of Onset   • Hypertension Mother    • Heart Disease Mother    • Arthritis Mother    • Sleep Apnea Mother    • Diabetes Father    • Heart Disease Father    • Sleep Apnea Sister    • Sleep Apnea Sister        Medications:   Current Outpatient Prescriptions   Medication   • diazePAM (VALIUM) 5 MG Tab   • baclofen (LIORESAL) 20 MG tablet   • HYDROcodone-acetaminophen (NORCO) 5-325 MG Tab per tablet   • naproxen (NAPROSYN) 500 MG Tab   • furosemide (LASIX) 20 MG Tab   • potassium chloride SA (KDUR) 20 MEQ Tab CR   • fluticasone (FLONASE) 50 MCG/ACT nasal spray   • thyroid (ARMOUR THYROID) 60 MG Tab   • metformin (GLUCOPHAGE) 1000 MG tablet   • MICROGESTIN 1-20 MG-MCG per tablet   • NON SPECIFIED   • esterified estrogens-methyltest (ESTRATEST HS) 0.625-1.25 MG Tab   • Blood Glucose Monitoring Suppl Device     No current facility-administered medications for this visit.        Allergies:   Allergies   Allergen Reactions   • Asa [Aspirin] Vomiting and Swelling     Facial swelling, patient takes ibuprofen at home       Social Hx:   Social History     Social History   • Marital status: Single     Spouse name: N/A   • Number of children: N/A   • Years of education: N/A     Occupational History   •       Social History Main Topics   • Smoking status: Never Smoker   • Smokeless tobacco: Never Used   • Alcohol use No   • Drug use: No   • Sexual activity: Yes     Partners: Male     Other Topics Concern   •  Service No   • Blood Transfusions No   • Caffeine Concern No   • Occupational Exposure No   • Hobby Hazards No   • Sleep Concern Yes   • Stress Concern No   • Weight Concern Yes   • Special Diet No   • Back Care No   • Exercise No   • Bike Helmet No   •  "Seat Belt No   • Self-Exams No     Social History Narrative   • No narrative on file         EXAMINATION     Physical Exam:   Vitals: Blood pressure 128/98, pulse 97, temperature 36.8 °C (98.2 °F), height 1.651 m (5' 5\"), weight (!) 150.6 kg (332 lb), last menstrual period 03/29/2018, SpO2 90 %.    Constitutional:   Body Habitus: Body mass index is 55.25 kg/m².  Cooperation: Fully cooperates with exam  Appearance: Well-groomed, well-nourished, not disheveled   Eyes: No scleral icterus, no proptosis   ENT -no obvious auditory deficits, no facial droop   Skin -no rashes or lesions noted   Respiratory-  breathing comfortable on room air, no audible wheezing  Cardiovascular- capillary refills less than 2 seconds. 2+ lower extremity edema to midshin, 3+ pedal edema bilaterally.   Gastrointestinal - Diffuse tenderness to palpation in the abdomen  Psychiatric- alert and oriented ×3. Normal affect.   Gait - normal gait, no use of ambulatory device, nonantalgic; heel and toe walking is intact bilaterally    Musculoskeletal -   Cervical spine   Inspection: No deformities of the skin over the cervical spine. No rashes or lesions.  full  A/P ROM in all directions, without  pain    No signs of muscular atrophy in bilateral upper extremities       Thoracic/Lumbar Spine/Sacral Spine/Hips   Inspection: No evidence of atrophy in bilateral lower extremities throughout   ROM: full  AROM with flexion, extension, lateral flexion, and rotation bilaterally, with pain in back extension    Palpation:   No tenderness to palpation in midline at T1-T12 levels. No tenderness to palpation in the left and right of the midline T1-L5, POSITIVE for tenderness to palpation to the para-midline region in the lower lumbar levels.  palpation over SI joint: positive bilaterally    palpation over buttock: negative bilaterally    palpation in hip or over the greater trochanters: negative bilaterally      Lumbar spine Special tests  Neuro tension  Straight " leg test negative bilaterally      HIP  FAIR test negative bilaterally    Range of motion in the hips is within normal limits in flexion, extension, abduction, internal rotation, external rotation.    SI joint tests  Observation patient sits on one buttocks: Negative  DEVIKA test negative bilaterally     Neuro     Key points for the international standards for neurological classification of spinal cord injury (ISNCSCI) to light touch.     Dermatome R L   C4 2  2   C5 2 2   C6 2 2   C7 2 2   C8 2 2   T1 2 2   T2 2 2   L2 2 2   L3 2 2   L4 2 2   L5 2 2   S1 2 2   S2 2 2       Motor Exam Upper Extremities   ? Myotome R L   Shoulder flexion C5 5 5   Elbow flexion C5 5 5   Wrist extension C6 5 5   Elbow extension C7 5 5   Finger flexion C8 5 5   Finger abduction T1 5 5         Motor Exam Lower Extremities    ? Myotome R L   Hip flexion L2 5 5   Knee extension L3 5 5   Ankle dorsiflexion L4 5 5   Toe extension L5 5 5   Ankle plantarflexion S1 5 5       Blevins’s sign negative bilaterally   Babinski sign negative bilaterally   Clonus of the ankle negative bilaterally     Reflexes  ?  R L   Biceps  2+  2+   Brachioradialis  2+ 2+   Patella  2+ 2+   Achilles   1+ 1+       MEDICAL DECISION MAKING    Medical records review: see under HPI section.     DATA    Labs:   Lab Results   Component Value Date/Time    SODIUM 138 01/17/2018 07:37 AM    POTASSIUM 3.9 01/17/2018 07:37 AM    CHLORIDE 105 01/17/2018 07:37 AM    CO2 26 01/17/2018 07:37 AM    GLUCOSE 150 (H) 01/17/2018 07:37 AM    BUN 14 01/17/2018 07:37 AM    CREATININE 0.74 01/17/2018 07:37 AM        No results found for: PROTHROMBTM, INR     Lab Results   Component Value Date/Time    WBC 8.1 03/09/2018 10:01 AM    RBC 5.96 (H) 03/09/2018 10:01 AM    HEMOGLOBIN 13.3 03/09/2018 10:01 AM    HEMATOCRIT 44.0 03/09/2018 10:01 AM    MCV 73.8 (L) 03/09/2018 10:01 AM    MCH 22.3 (L) 03/09/2018 10:01 AM    MCHC 30.2 (L) 03/09/2018 10:01 AM    NEUTSPOLYS 63.10 03/09/2018 10:01 AM     LYMPHOCYTES 26.20 03/09/2018 10:01 AM    MONOCYTES 7.60 03/09/2018 10:01 AM    EOSINOPHILS 2.50 03/09/2018 10:01 AM    BASOPHILS 0.40 03/09/2018 10:01 AM          Imaging: I personally reviewed following images, these are my reads    Xrays lumbar spine 04/12/2018: Normal xray spine      Diagnosis   Diagnoses of Acute bilateral low back pain with bilateral sciatica, H/O vitamin D deficiency, and Anxiety were pertinent to this visit.      ASSESSMENT:  Tara Patterson 38 y.o. Female with low back pain radiating into the legs bilaterally with decreased achilles reflexes bilaterally      Tara was seen today for new patient.    Diagnoses and all orders for this visit:    Acute bilateral low back pain with bilateral sciatica  -     COMP METABOLIC PANEL; Future  -     CBC WITHOUT DIFFERENTIAL; Future  -     MR-LUMBAR SPINE-W/O; Future  -     diazePAM (VALIUM) 5 MG Tab; Take 1 Tab by mouth MRI Once for 1 dose.    H/O vitamin D deficiency  -     VITAMIN D,25 HYDROXY; Future    Anxiety  -     diazePAM (VALIUM) 5 MG Tab; Take 1 Tab by mouth MRI Once for 1 dose.    We discussed getting an MRI of the lumbar spine given the radicular symptoms and decreased achilles reflexes.  She also has tenderness over the SIJ bilaterally, although I would not expect to see the reflex changes and radicular symptoms would not typically go down to the feet.    She will continue to follow-up with her PCP and other providers for other medical complaints.  Her PCP is evaluating systemic symptoms and recent edema.      Additionally, she reports a history of vitamin D deficiency.  I have ordered this today.      Follow-up:after imaging and labs    Please note that this dictation was created using voice recognition software. I have made every reasonable attempt to correct obvious errors but there may be errors of grammar and content that I may have overlooked prior to finalization of this note.      John Caal MD  Physical Medicine and  Rehabilitation  Interventional Spine and Sports Physiatry  [facility]       CC Hakeem Chavarria P.A.

## 2018-04-18 NOTE — LETTER
April 18, 2018         Patient: Tara Patterson   YOB: 1980   Date of Visit: 4/18/2018           To Whom it May Concern:    Tara Patterson was seen in my clinic on 4/18/2018.    If you have any questions or concerns, please don't hesitate to call.        Sincerely,           John Caal M.D.  Electronically Signed

## 2018-04-18 NOTE — ASSESSMENT & PLAN NOTE
She also states she has an appointment in near future with gynecology to discuss her hysterectomy in July.

## 2018-04-24 ENCOUNTER — SLEEP STUDY (OUTPATIENT)
Dept: SLEEP MEDICINE | Facility: MEDICAL CENTER | Age: 38
End: 2018-04-24
Attending: NURSE PRACTITIONER
Payer: COMMERCIAL

## 2018-04-24 DIAGNOSIS — G47.33 OBSTRUCTIVE SLEEP APNEA SYNDROME: ICD-10-CM

## 2018-04-24 PROCEDURE — 95811 POLYSOM 6/>YRS CPAP 4/> PARM: CPT | Performed by: INTERNAL MEDICINE

## 2018-04-25 NOTE — PROCEDURES
Clinical Comments:  The patient underwent a CPAP titration using the standard montage for measurement of parameters of sleep, respiratory events, movement abnormalities, heart rate and rhythm. A microphone was used to monitor snoring.      INTERPRETATION:  Testing began at 10:14:53 PM.  The total recording time was 295.2 minutes with a sleep period of 291.8 minutes and the total sleep time was 241.5 minutes with a sleep efficiency of 81.8%.  The sleep latency was 3.4 minutes, and REM latency was 52.0 minutes.  The patient experienced 23 arousals in total, for an arousal index of 5.7    RESPIRATORY: The patient had 0 apneas in total.  Of these, 0 were obstructive apneas, and 0 were central apneas.  This resulted in an apnea index (AI) of 0.0.  The patient had 101 hypopneas, for a hypopnea index of 25.1.  The overall AHI was 25.1, while the AHI during Stage R sleep was 37.4.  AHI while supine was N/A.    OXIMETRY: Oxygen saturation monitoring showed a mean SpO2 of 91.7%, with a minimum oxygen saturation of 68.0%.  Oxygen saturations were less than or = 89% for 43.5 minutes of sleep time.    CARDIAC: The highest heart rate during the recording was 98.0 beats per minute.  The average heart rate during sleep was 81.9 bpm.    LIMB MOVEMENTS: There were a total of 0 PLMs during sleep, of which 0 were PLMs arousals.  This resulted in a PLMS index of 0.0.    CPAP was tried from 8 to 12cm H2O.  BIPAP was tried from 16/12 to 23/18cm H2O.    The patient slept upright throughout testing.  The sleep efficiency was normal at 82%.  Sleep latency was reduced at 3 minutes.  Sleep architecture showed reduced stage III sleep.  No EKG or EMG abnormalities were observed.    CPAP therapy was started at 8 cm of water and titrated to 12 cm of water with persistent DAMASO, and consequently bilevel therapy was initiated.  BiPAP was titrated to 23/18 cm of water with resultant AHI: 2.4 and mean oximetry of 94%.    REM rebound was noted on BiPAP  therapy.    Interpretation:  Successful BiPAP titration in 23/18 cm of water using a medium ResMed F20 mask.    Recommendations:  BiPAP: 23/18 cmH2O using a medium ResMed F20 mask.

## 2018-04-26 ENCOUNTER — SLEEP CENTER VISIT (OUTPATIENT)
Dept: SLEEP MEDICINE | Facility: MEDICAL CENTER | Age: 38
End: 2018-04-26
Payer: COMMERCIAL

## 2018-04-26 ENCOUNTER — APPOINTMENT (OUTPATIENT)
Dept: PHYSICAL MEDICINE AND REHAB | Facility: MEDICAL CENTER | Age: 38
End: 2018-04-26
Payer: COMMERCIAL

## 2018-04-26 VITALS
DIASTOLIC BLOOD PRESSURE: 82 MMHG | SYSTOLIC BLOOD PRESSURE: 118 MMHG | HEIGHT: 65 IN | WEIGHT: 293 LBS | HEART RATE: 100 BPM | RESPIRATION RATE: 15 BRPM | BODY MASS INDEX: 48.82 KG/M2 | OXYGEN SATURATION: 96 %

## 2018-04-26 DIAGNOSIS — E66.01 MORBID OBESITY WITH BMI OF 50.0-59.9, ADULT (HCC): ICD-10-CM

## 2018-04-26 DIAGNOSIS — G47.33 OBSTRUCTIVE SLEEP APNEA SYNDROME: ICD-10-CM

## 2018-04-26 PROCEDURE — 99213 OFFICE O/P EST LOW 20 MIN: CPT | Performed by: NURSE PRACTITIONER

## 2018-04-26 NOTE — LETTER
" Once you receive your new PAP machine from the [employer] company you're referred to, we must see you back for an office visit between 30-90 days of you using the machine to review compliance.  If you were ordered an ASV machine, we need to see you in office no sooner than your 60th day on therapy.      This is a very important time frame for insurance purposes. If you do not follow up with our office for compliance your insurance may not continue to pay for the machine. Also if you do not use the machine for at least 4 hours each night, you may be deemed \"Incompliant\", in which case the insurance may also not continue to pay for the machine.      If you are incompliant, you may have to surrender your machine to the [employer] company and start the process over if you wish to continue therapy after that. Meaning a new office consult and new sleep studies.     For your first visit back with our office, please bring the whole machine with the power cord. We will download the compliance off the SD card in the machine, and are able to make changes if need be in office. Some machines have a modem and we can access the data wirelessly, if this is the case please make sure the DME company grants us access to your machine.  For all follow up appointments after that, you will only need to bring the SD card to the appointment.     If you are having any issues with the mask, you have a 30 day window to exchange and try something else with your DME company.  If you are having issues with the pressure, please call our office at [phone].  These issues can cause you to not be able to use machine appropriately, there for make you incompliant.    Please call our office if you have any questions.    Thank you, [facility].  [phone]     "

## 2018-04-26 NOTE — PROGRESS NOTES
Chief Complaint   Patient presents with   • Results     SS         HPI: This patient is a 38 y.o. female, who presents for titration results.     Home sleep study indicates very severe objective sleep apnea with an AHI of 79, minimum oxygen saturation of 40 %. She return for dedicated night titration. She did best on bilevel 23/18 which resulted in a brief period of REM, AHI was reduced to 2.4, mean saturation 93.5%. She felt significantly more rested the day after her SS. She did have a hard time tolerating the pressure in mask at times but is very eager to initiate therapy.    Past Medical History:   Diagnosis Date   • Diverticulosis    • DM (diabetes mellitus) (CMS-[facility])    • Hypertension    • PCOS (polycystic ovarian syndrome)        Social History   Substance Use Topics   • Smoking status: Never Smoker   • Smokeless tobacco: Never Used   • Alcohol use No       Family History   Problem Relation Age of Onset   • Hypertension Mother    • Heart Disease Mother    • Arthritis Mother    • Sleep Apnea Mother    • Diabetes Father    • Heart Disease Father    • Sleep Apnea Sister    • Sleep Apnea Sister        Current medications as of today   Current Outpatient Prescriptions   Medication Sig Dispense Refill   • baclofen (LIORESAL) 20 MG tablet Take 1 Tab by mouth 3 times a day. 30 Tab 1   • HYDROcodone-acetaminophen (NORCO) 5-325 MG Tab per tablet Take 1 Tab by mouth 1 time daily as needed for up to 30 days. 30 Tab 0   • naproxen (NAPROSYN) 500 MG Tab Take 1 Tab by mouth 2 times a day, with meals. 60 Tab 0   • furosemide (LASIX) 20 MG Tab Take 1 Tab by mouth every day. 30 Tab 11   • potassium chloride SA (KDUR) 20 MEQ Tab CR Take 2 Tabs by mouth every day. 30 Tab 11   • fluticasone (FLONASE) 50 MCG/ACT nasal spray Spray 1 Spray in nose 2 times a day. 16 g 5   • NON SPECIFIED TAKES BIRTH CONTROL, NOT SURE WHICH ONE.     • thyroid (ARMOUR THYROID) 60 MG Tab Take 1 Tab by mouth every day. 90 Tab 3   • metformin (GLUCOPHAGE)  "1000 MG tablet Take 1 Tab by mouth 2 times a day, with meals. 60 Tab 3   • MICROGESTIN 1-20 MG-MCG per tablet      • esterified estrogens-methyltest (ESTRATEST HS) 0.625-1.25 MG Tab Take 1 Tab by mouth every day.     • Blood Glucose Monitoring Suppl Device Meter: Dispense Device of Insurance Preference. Sig. Use as directed for blood sugar monitoring. #1. NR. 1 Device 0     No current facility-administered medications for this visit.        Allergies: Asa [aspirin]    Blood pressure 118/82, pulse 100, resp. rate 15, height 1.651 m (5' 5\"), weight (!) 150.6 kg (332 lb), last menstrual period 03/29/2018, SpO2 96 %.      ROS: As per HPI and otherwise negative if not stated.      Physical exam:   Constitutional: Morbid obesity, in no acute distress  Eyes: PERRL    Neck: supple, trachea midline  Respiratory: no intercostal retractions or accessory muscle use   Lungs auscultation: Diminished breath sounds throughout   Cardiovascular: Regular rate rhythm no murmurs, rubs or gallops  Musculoskeletal: no clubbing or cyanosis  Skin: No rashes or lesions noted on exposed skin  Neuro: No focal deficit noted  Psychiatric: Oriented to time, person and place.     Diagnosis:  1. Morbid obesity with BMI of 50.0-59.9, adult (HCC)     2. Obstructive sleep apnea syndrome  DME BIPAP       Plan:    Sleep study was reviewed with her in detail. We discussed the difference between CPAP and BiPAP. She understands that she can try several different masks for comfort. She understands pressures can be adjusted somewhat for comfort. She is encouraged to try BiPAP therapy.    1. Initiate BiPAP, order to DME [employer]  2. Follow-up in approximately 3 months for compliance download, sooner if needed    "

## 2018-04-27 ENCOUNTER — HOSPITAL ENCOUNTER (OUTPATIENT)
Dept: LAB | Facility: MEDICAL CENTER | Age: 38
End: 2018-04-27
Attending: PHYSICAL MEDICINE & REHABILITATION
Payer: COMMERCIAL

## 2018-04-27 DIAGNOSIS — Z86.39 H/O VITAMIN D DEFICIENCY: ICD-10-CM

## 2018-04-27 DIAGNOSIS — M54.41 ACUTE BILATERAL LOW BACK PAIN WITH BILATERAL SCIATICA: ICD-10-CM

## 2018-04-27 DIAGNOSIS — M54.42 ACUTE BILATERAL LOW BACK PAIN WITH BILATERAL SCIATICA: ICD-10-CM

## 2018-04-27 LAB
25(OH)D3 SERPL-MCNC: 14 NG/ML (ref 30–100)
ALBUMIN SERPL BCP-MCNC: 3.8 G/DL (ref 3.2–4.9)
ALBUMIN/GLOB SERPL: 1.2 G/DL
ALP SERPL-CCNC: 62 U/L (ref 30–99)
ALT SERPL-CCNC: 76 U/L (ref 2–50)
ANION GAP SERPL CALC-SCNC: 9 MMOL/L (ref 0–11.9)
AST SERPL-CCNC: 39 U/L (ref 12–45)
BILIRUB SERPL-MCNC: 0.6 MG/DL (ref 0.1–1.5)
BUN SERPL-MCNC: 10 MG/DL (ref 8–22)
CALCIUM SERPL-MCNC: 8.5 MG/DL (ref 8.5–10.5)
CHLORIDE SERPL-SCNC: 103 MMOL/L (ref 96–112)
CO2 SERPL-SCNC: 25 MMOL/L (ref 20–33)
CREAT SERPL-MCNC: 0.57 MG/DL (ref 0.5–1.4)
ERYTHROCYTE [DISTWIDTH] IN BLOOD BY AUTOMATED COUNT: 50.5 FL (ref 35.9–50)
GLOBULIN SER CALC-MCNC: 3.1 G/DL (ref 1.9–3.5)
GLUCOSE SERPL-MCNC: 123 MG/DL (ref 65–99)
HCT VFR BLD AUTO: 41.1 % (ref 37–47)
HGB BLD-MCNC: 12.5 G/DL (ref 12–16)
MCH RBC QN AUTO: 22.1 PG (ref 27–33)
MCHC RBC AUTO-ENTMCNC: 30.4 G/DL (ref 33.6–35)
MCV RBC AUTO: 72.7 FL (ref 81.4–97.8)
PLATELET # BLD AUTO: 213 K/UL (ref 164–446)
POTASSIUM SERPL-SCNC: 3.4 MMOL/L (ref 3.6–5.5)
PROT SERPL-MCNC: 6.9 G/DL (ref 6–8.2)
RBC # BLD AUTO: 5.65 M/UL (ref 4.2–5.4)
SODIUM SERPL-SCNC: 137 MMOL/L (ref 135–145)
WBC # BLD AUTO: 8.2 K/UL (ref 4.8–10.8)

## 2018-04-27 PROCEDURE — 80053 COMPREHEN METABOLIC PANEL: CPT

## 2018-04-27 PROCEDURE — 85027 COMPLETE CBC AUTOMATED: CPT

## 2018-04-27 PROCEDURE — 82306 VITAMIN D 25 HYDROXY: CPT

## 2018-04-27 PROCEDURE — 36415 COLL VENOUS BLD VENIPUNCTURE: CPT

## 2018-04-30 ENCOUNTER — TELEPHONE (OUTPATIENT)
Dept: PULMONOLOGY | Facility: HOSPICE | Age: 38
End: 2018-04-30

## 2018-04-30 NOTE — TELEPHONE ENCOUNTER
----- Message from EDUARDA Foote sent at 4/27/2018  4:31 PM PDT -----  Patient in the next available appointment with any provider.  Do we have anything before August?

## 2018-05-02 DIAGNOSIS — E55.9 VITAMIN D DEFICIENCY: ICD-10-CM

## 2018-05-02 NOTE — PROGRESS NOTES
I called patient tot twice today and LV , to call me back, I will call tomorrow         Please let Tara know that her vitamin D was very low and I have written for her to take a supplement once a week.  Also, she has a slightly low potassium.  I see that she has potassium written, is she taking this?  If she has been taking two a day, have her take 3/day and follow-up with her PCP

## 2018-05-03 ENCOUNTER — HOSPITAL ENCOUNTER (OUTPATIENT)
Dept: RADIOLOGY | Facility: MEDICAL CENTER | Age: 38
End: 2018-05-03
Attending: PHYSICAL MEDICINE & REHABILITATION
Payer: COMMERCIAL

## 2018-05-03 DIAGNOSIS — M54.41 ACUTE BILATERAL LOW BACK PAIN WITH BILATERAL SCIATICA: ICD-10-CM

## 2018-05-03 DIAGNOSIS — F41.9 ANXIETY: ICD-10-CM

## 2018-05-03 DIAGNOSIS — M54.42 ACUTE BILATERAL LOW BACK PAIN WITH BILATERAL SCIATICA: ICD-10-CM

## 2018-05-03 PROCEDURE — 72148 MRI LUMBAR SPINE W/O DYE: CPT

## 2018-05-03 RX ORDER — DIAZEPAM 5 MG/1
5 TABLET ORAL
Qty: 2 TAB | Refills: 0 | Status: SHIPPED | OUTPATIENT
Start: 2018-05-03 | End: 2018-05-04

## 2018-05-03 NOTE — PROGRESS NOTES
I spoke with pt and advise, also she state she is doing the MRI today but she can't find the Rx for valium and she is asking for another Rx, patient came to  the Rx.

## 2018-05-03 NOTE — PROGRESS NOTES
I called her contact information we have on chart and the phone # , was unable to receive any massage.    I sent a message to [employer] ,regarding her results.

## 2018-05-08 ENCOUNTER — OFFICE VISIT (OUTPATIENT)
Dept: PHYSICAL MEDICINE AND REHAB | Facility: MEDICAL CENTER | Age: 38
End: 2018-05-08
Payer: COMMERCIAL

## 2018-05-08 VITALS
TEMPERATURE: 98.2 F | WEIGHT: 293 LBS | BODY MASS INDEX: 48.82 KG/M2 | DIASTOLIC BLOOD PRESSURE: 98 MMHG | SYSTOLIC BLOOD PRESSURE: 142 MMHG | OXYGEN SATURATION: 91 % | HEIGHT: 65 IN | HEART RATE: 98 BPM

## 2018-05-08 DIAGNOSIS — M51.36 LUMBAR DEGENERATIVE DISC DISEASE: ICD-10-CM

## 2018-05-08 DIAGNOSIS — E55.9 VITAMIN D DEFICIENCY: ICD-10-CM

## 2018-05-08 DIAGNOSIS — R20.2 PARESTHESIA: ICD-10-CM

## 2018-05-08 DIAGNOSIS — M47.816 LUMBAR SPONDYLOSIS: ICD-10-CM

## 2018-05-08 PROCEDURE — 99214 OFFICE O/P EST MOD 30 MIN: CPT | Performed by: PHYSICAL MEDICINE & REHABILITATION

## 2018-05-08 ASSESSMENT — PAIN SCALES - GENERAL: PAINLEVEL: 5=MODERATE PAIN

## 2018-05-08 NOTE — PROGRESS NOTES
Follow up patient note  Pain Medicine, Interventional spine and sports physiatry, Physical medicine rehabilitation      Chief complaint:   Low back pain     HISTORY    Please see new patient note dated 4/18/18 Dr. Caal,  for more details.     HPI  Patient identification/Interval history: Tara Patterson 38 y.o. female returns for follow-up after imaging of her low back.  She reports that she continues to have pain in her low back with numbness radiating into both legs into her feet.  Her symptoms are constant.  Chiropractic care has been equivocal as it relates to her symptoms.  Medications help keep symptoms manageable.    Swelling continues in her legs without any clearly identified etiology as far as I can tell and as far as she knows.  Activity is difficult and she gets winded easily.  She has plans for continued medical evaluation and work-up with other specialists.     ROS Red Flags :   Fever, Chills, Sweats: Denies  Involuntary Weight Loss: Denies  Bowel/Bladder Incontinence: Denies  Saddle Anesthesia: Denies  Mood: Anxiety has been worse lately      PMHx:   Past Medical History:   Diagnosis Date   • Diverticulosis    • DM (diabetes mellitus) (HCC)    • Hypertension    • PCOS (polycystic ovarian syndrome)        PSHx:   History reviewed. No pertinent surgical history.    Family history   Family History   Problem Relation Age of Onset   • Hypertension Mother    • Heart Disease Mother    • Arthritis Mother    • Sleep Apnea Mother    • Diabetes Father    • Heart Disease Father    • Sleep Apnea Sister    • Sleep Apnea Sister        Medications:    Current Outpatient Prescriptions   Medication   • Cholecalciferol (VITAMIN D3) 96463 units Tab   • baclofen (LIORESAL) 20 MG tablet   • HYDROcodone-acetaminophen (NORCO) 5-325 MG Tab per tablet   • naproxen (NAPROSYN) 500 MG Tab   • furosemide (LASIX) 20 MG Tab   • potassium chloride SA (KDUR) 20 MEQ Tab CR   • fluticasone (FLONASE) 50 MCG/ACT nasal spray   • thyroid  "(ARMOUR THYROID) 60 MG Tab   • Blood Glucose Monitoring Suppl Device   • metformin (GLUCOPHAGE) 1000 MG tablet   • MICROGESTIN 1-20 MG-MCG per tablet   • NON SPECIFIED   • esterified estrogens-methyltest (ESTRATEST HS) 0.625-1.25 MG Tab     No current facility-administered medications for this visit.        Allergies:    Allergies   Allergen Reactions   • Asa [Aspirin] Vomiting and Swelling     Facial swelling, patient takes ibuprofen at home       Social Hx:    Social History     Social History   • Marital status: Single     Spouse name: N/A   • Number of children: N/A   • Years of education: N/A     Occupational History   •       Social History Main Topics   • Smoking status: Never Smoker   • Smokeless tobacco: Never Used   • Alcohol use No   • Drug use: No   • Sexual activity: Yes     Partners: Male     Other Topics Concern   •  Service No   • Blood Transfusions No   • Caffeine Concern No   • Occupational Exposure No   • Hobby Hazards No   • Sleep Concern Yes   • Stress Concern No   • Weight Concern Yes   • Special Diet No   • Back Care No   • Exercise No   • Bike Helmet No   • Seat Belt No   • Self-Exams No     Social History Narrative   • No narrative on file       EXAMINATION     Physical Exam:   Vitals: Blood pressure 142/98, pulse 98, temperature 36.8 °C (98.2 °F), height 1.651 m (5' 5\"), weight (!) 150.6 kg (332 lb), SpO2 91 %.    Constitutional:   Body Habitus: Body mass index is 55.25 kg/m².  Cooperation: Fully cooperates with exam  Appearance: Well-groomed no disheveled, in no acute distress  Respiratory-  breathing comfortable on room air, no audible wheezing  Extremities- 2+ pitting edema pretibial edema and 3+ pedal edema bilaterally  Psychiatric- alert and oriented ×3. Normal affect.   Spine: No focal motor deficits in the lower extremities bilaterally.  Sensation is intact to light touch bilaterally.   Reflexes are 2+ patella and achilles bilaterally.  Seated SLR is negative " bilaterally.      MEDICAL DECISION MAKING    DATA    Labs:   Lab Results   Component Value Date/Time    SODIUM 137 04/27/2018 09:34 AM    POTASSIUM 3.4 (L) 04/27/2018 09:34 AM    CHLORIDE 103 04/27/2018 09:34 AM    CO2 25 04/27/2018 09:34 AM    GLUCOSE 123 (H) 04/27/2018 09:34 AM    BUN 10 04/27/2018 09:34 AM    CREATININE 0.57 04/27/2018 09:34 AM      No results found for: PROTHROMBTM, INR     Lab Results   Component Value Date/Time    WBC 8.2 04/27/2018 09:34 AM    RBC 5.65 (H) 04/27/2018 09:34 AM    HEMOGLOBIN 12.5 04/27/2018 09:34 AM    HEMATOCRIT 41.1 04/27/2018 09:34 AM    MCV 72.7 (L) 04/27/2018 09:34 AM    MCH 22.1 (L) 04/27/2018 09:34 AM    MCHC 30.4 (L) 04/27/2018 09:34 AM    NEUTSPOLYS 63.10 03/09/2018 10:01 AM    LYMPHOCYTES 26.20 03/09/2018 10:01 AM    MONOCYTES 7.60 03/09/2018 10:01 AM    EOSINOPHILS 2.50 03/09/2018 10:01 AM    BASOPHILS 0.40 03/09/2018 10:01 AM        Lab Results   Component Value Date/Time    HBA1C 8.2 (H) 01/17/2018 07:37 AM      Vitamin D 4/27/18  14    Imaging: I personally reviewed following images  MRI lumbar spine: Reviewed and reviewed with patient at the time of the visit, 05/03/2018  Mild loss of disc height at L5-S1 with a high-intensity zone.  There is a posterior disc bulge with mild right neuroforaminal narrowing.  L4-5 Disc bulge with central or foraminal stenosis.  No disc bulge, central or neuroforaminal stenosis at L1-2, L2-3, or L3-4.      I reviewed the following radiology reports                                        Results for orders placed during the hospital encounter of 05/03/18   MR-LUMBAR SPINE-W/O    Impression Multilevel degenerative changes of the lumbar spine as described above.                                               DIAGNOSIS   Visit Diagnoses     ICD-10-CM   1. Lumbar spondylosis M47.816   2. Paresthesia R20.2   3. Lumbar degenerative disc disease M51.36   4. Vitamin D deficiency E55.9       ASSESSMENT and PLAN:     Tara Patterson 38 y.o.  female who returns after labwork and MRI lumbar spine.    Tara was seen today for follow-up.    Diagnoses and all orders for this visit:    Lumbar spondylosis  -     REFERRAL TO PHYSICAL THERAPY Reason for Therapy: Eval/Treat/Report    Paresthesia    Lumbar degenerative disc disease  -     REFERRAL TO PHYSICAL THERAPY Reason for Therapy: Eval/Treat/Report    Vitamin D deficiency    We discussed the findings of MRI lumbar spine.  After discussing this and the options, we have discussed starting physical therapy.  It is not clear that her symptoms can be explained by findings on MRI.    Encouraged her to continue with her efforts toward weight management.    Vitamin D deficiency identified, plan to continue with weekly drisdol supplementation.    Reviewed CMP results and reviewed that there has been some improvement in liver function test abnormalities.  Recommend that she follow-up within the week with her primary care provider regarding hypokalemia. Temporarily increased supplementation from 20mEq twice a day to three times a day.    Follow up: 1 month    Thank you for allowing me to participate in the care of this patient. If you have any questions please not hesitate to contact me.      Total time: 30 minutes face-to-face time. I spent greater than 50% of the time for patient care and coordination on this date, including with the patient as per assessment and plan above.       Please note that this dictation was created using voice recognition software. I have made every reasonable attempt to correct obvious errors but there may be errors of grammar and content that I may have overlooked prior to finalization of this note.    John Caal MD  Interventional Spine and Sports Physiatry  Physical Medicine and Rehabilitation  [facility]  5/8/2018 4:51 PM

## 2018-05-25 ENCOUNTER — OFFICE VISIT (OUTPATIENT)
Dept: MEDICAL GROUP | Facility: MEDICAL CENTER | Age: 38
End: 2018-05-25
Payer: COMMERCIAL

## 2018-05-25 VITALS
SYSTOLIC BLOOD PRESSURE: 128 MMHG | OXYGEN SATURATION: 96 % | BODY MASS INDEX: 48.82 KG/M2 | WEIGHT: 293 LBS | HEART RATE: 94 BPM | TEMPERATURE: 97.7 F | HEIGHT: 65 IN | DIASTOLIC BLOOD PRESSURE: 80 MMHG

## 2018-05-25 DIAGNOSIS — G89.29 CHRONIC BILATERAL LOW BACK PAIN WITH BILATERAL SCIATICA: ICD-10-CM

## 2018-05-25 DIAGNOSIS — M54.41 CHRONIC BILATERAL LOW BACK PAIN WITH BILATERAL SCIATICA: ICD-10-CM

## 2018-05-25 DIAGNOSIS — F33.0 MILD EPISODE OF RECURRENT MAJOR DEPRESSIVE DISORDER (HCC): ICD-10-CM

## 2018-05-25 DIAGNOSIS — M54.42 ACUTE BILATERAL LOW BACK PAIN WITH BILATERAL SCIATICA: ICD-10-CM

## 2018-05-25 DIAGNOSIS — M54.41 ACUTE BILATERAL LOW BACK PAIN WITH BILATERAL SCIATICA: ICD-10-CM

## 2018-05-25 DIAGNOSIS — M54.42 CHRONIC BILATERAL LOW BACK PAIN WITH BILATERAL SCIATICA: ICD-10-CM

## 2018-05-25 DIAGNOSIS — E55.9 HYPOVITAMINOSIS D: ICD-10-CM

## 2018-05-25 DIAGNOSIS — E66.01 MORBID OBESITY WITH BMI OF 50.0-59.9, ADULT (HCC): ICD-10-CM

## 2018-05-25 PROCEDURE — 99214 OFFICE O/P EST MOD 30 MIN: CPT | Performed by: PHYSICIAN ASSISTANT

## 2018-05-25 RX ORDER — HYDROCODONE BITARTRATE AND ACETAMINOPHEN 5; 325 MG/1; MG/1
1 TABLET ORAL
Qty: 30 TAB | Refills: 0 | Status: SHIPPED | OUTPATIENT
Start: 2018-05-25 | End: 2018-06-24

## 2018-05-25 RX ORDER — NAPROXEN 500 MG/1
500 TABLET ORAL 2 TIMES DAILY WITH MEALS
Qty: 60 TAB | Refills: 0 | Status: SHIPPED | OUTPATIENT
Start: 2018-05-25 | End: 2018-08-07

## 2018-05-25 RX ORDER — BUPROPION HYDROCHLORIDE 150 MG/1
150 TABLET ORAL EVERY MORNING
Qty: 30 TAB | Refills: 1 | Status: SHIPPED | OUTPATIENT
Start: 2018-05-25 | End: 2018-08-07 | Stop reason: SDUPTHER

## 2018-05-25 RX ORDER — BACLOFEN 20 MG/1
20 TABLET ORAL 3 TIMES DAILY
Qty: 30 TAB | Refills: 1 | Status: SHIPPED | OUTPATIENT
Start: 2018-05-25 | End: 2018-07-15

## 2018-05-25 ASSESSMENT — PATIENT HEALTH QUESTIONNAIRE - PHQ9
CLINICAL INTERPRETATION OF PHQ2 SCORE: 6
5. POOR APPETITE OR OVEREATING: 0 - NOT AT ALL
SUM OF ALL RESPONSES TO PHQ QUESTIONS 1-9: 19

## 2018-05-25 NOTE — ASSESSMENT & PLAN NOTE
This is a 38-year-old female who is here today to discuss her weight. She has lost a couple pounds and is happy with that. She is continuing to eat healthy. Watches her food intake. She is told repeatedly how to manage her weight and is getting frustrated. Her  has lost 15-20 pounds with her diet. She now would like to try to find out other options besides medication dietary changes. Her chronic weight is causing depression as well as lower extremity swelling and pain. In other conditions.

## 2018-05-25 NOTE — ASSESSMENT & PLAN NOTE
Vitamin D level was recently found to be low at 14. She is taking 50,000 units of vitamin D weekly.

## 2018-05-25 NOTE — PROGRESS NOTES
Subjective:   Tara Patterson is a 38 y.o. female here today for morbid obesity and depression.    Morbid obesity with BMI of 50.0-59.9, adult ([facility]ly [facility])  This is a 38-year-old female who is here today to discuss her weight. She has lost a couple pounds and is happy with that. She is continuing to eat healthy. Watches her food intake. She is told repeatedly how to manage her weight and is getting frustrated. Her  has lost 15-20 pounds with her diet. She now would like to try to find out other options besides medication dietary changes. Her chronic weight is causing depression as well as lower extremity swelling and pain. In other conditions.    Chronic bilateral low back pain with bilateral sciatica  She was seen by physiatry. MRI of her back showed bulging disc but no significant stenosis. It was advised that she have physical therapy. She states that Norco helps with her back at nighttime. She also take a muscle relaxer. She is requesting a refill. She has back pain all day.    Hypovitaminosis D  Vitamin D level was recently found to be low at 14. She is taking 50,000 units of vitamin D weekly.    Mild episode of recurrent major depressive disorder (HCC)  Her depression has returned. She was on Wellbutrin in the past. Recently she has found herself thinking about her being insignificant. She is  with children. She has been easily moved to tears. Like to start Wellbutrin again. Denies any homicidal or suicidal ideations. Tries to be positive.       Current medicines (including changes today)  Current Outpatient Prescriptions   Medication Sig Dispense Refill   • Norethin-Eth Estrad-Fe Biphas (LO LOESTRIN FE) 1 MG-10 MCG / 10 MCG Tab Take  by mouth.     • baclofen (LIORESAL) 20 MG tablet Take 1 Tab by mouth 3 times a day. 30 Tab 1   • naproxen (NAPROSYN) 500 MG Tab Take 1 Tab by mouth 2 times a day, with meals. 60 Tab 0   • HYDROcodone-acetaminophen (NORCO) 5-325 MG Tab per tablet Take 1 Tab by mouth 1 time  "daily as needed for up to 30 days. 30 Tab 0   • buPROPion (WELLBUTRIN XL) 150 MG XL tablet Take 1 Tab by mouth every morning. 30 Tab 1   • Cholecalciferol (VITAMIN D3) 44356 units Tab Take 50,000 Units by mouth every 7 days for 56 days. 4 Tab 1   • furosemide (LASIX) 20 MG Tab Take 1 Tab by mouth every day. 30 Tab 11   • fluticasone (FLONASE) 50 MCG/ACT nasal spray Spray 1 Spray in nose 2 times a day. 16 g 5   • thyroid (ARMOUR THYROID) 60 MG Tab Take 1 Tab by mouth every day. 90 Tab 3   • Blood Glucose Monitoring Suppl Device Meter: Dispense Device of Insurance Preference. Sig. Use as directed for blood sugar monitoring. #1. NR. 1 Device 0   • metformin (GLUCOPHAGE) 1000 MG tablet Take 1 Tab by mouth 2 times a day, with meals. 60 Tab 3   • potassium chloride SA (KDUR) 20 MEQ Tab CR Take 2 Tabs by mouth every day. 30 Tab 11   • NON SPECIFIED TAKES BIRTH CONTROL, NOT SURE WHICH ONE.       No current facility-administered medications for this visit.      She  has a past medical history of Diverticulosis; DM (diabetes mellitus) (HCC); Hypertension; and PCOS (polycystic ovarian syndrome).    Social History and Family History were reviewed and updated.    ROS   No chest pain, no shortness of breath, no abdominal pain and all other systems were reviewed and are negative.       Objective:     Blood pressure 128/80, pulse 94, temperature 36.5 °C (97.7 °F), height 1.651 m (5' 5\"), weight (!) 148 kg (326 lb 4.5 oz), SpO2 96 %. Body mass index is 54.3 kg/m².   Physical Exam:  Constitutional: Alert, no distress.  Skin: Warm, dry, good turgor, no rashes in visible areas.  Eye: Equal, round and reactive, conjunctiva clear, lids normal.  ENMT: Lips without lesions, good dentition, oropharynx clear.  Neck: Trachea midline, no masses.   Lymph: No cervical or supraclavicular lymphadenopathy  Respiratory: Unlabored respiratory effort, lungs appear clear, no wheezes.  Cardiovascular: Normal S1, S2, no murmur, no edema.  Psych: Alert " and oriented x3, normal affect and mood.        Assessment and Plan:   The following treatment plan was discussed    1. Morbid obesity with BMI of 50.0-59.9, adult (HCC)  Chronic condition. Referred to bariatric surgery. Provided Wellbutrin to help with depression. Continue dietary changes. Advised to keep a log of her diet.  - REFERRAL TO BARIATRIC SURGERY  - buPROPion (WELLBUTRIN XL) 150 MG XL tablet; Take 1 Tab by mouth every morning.  Dispense: 30 Tab; Refill: 1    2. Chronic bilateral low back pain with bilateral sciatica  Chronic condition. Mild degenerative disc disease. Follow with physiatry. Follow with PT. Provided Norco, naproxen and baclofen as directed.  reviewed. Medication is appropriate for patient. Controlled substance agreement on file. Morphine milliequivalents currently at 5 mg.  - baclofen (LIORESAL) 20 MG tablet; Take 1 Tab by mouth 3 times a day.  Dispense: 30 Tab; Refill: 1  - naproxen (NAPROSYN) 500 MG Tab; Take 1 Tab by mouth 2 times a day, with meals.  Dispense: 60 Tab; Refill: 0  - HYDROcodone-acetaminophen (NORCO) 5-325 MG Tab per tablet; Take 1 Tab by mouth 1 time daily as needed for up to 30 days.  Dispense: 30 Tab; Refill: 0    3. Mild episode of recurrent major depressive disorder (HCC)  Acute, new onset condition. Referred to behavioral health to speak to a counselor. We'll try Wellbutrin 50 mg XL tablet. I'll be in one month.  - REFERRAL TO BEHAVIORAL HEALTH  - buPROPion (WELLBUTRIN XL) 150 MG XL tablet; Take 1 Tab by mouth every morning.  Dispense: 30 Tab; Refill: 1    4. Hypovitaminosis D  Acute, new onset condition. Continue 50,000 vitamin D units weekly. 3 months we'll check labs.        Followup: Return in about 4 weeks (around 6/22/2018), or if symptoms worsen or fail to improve.    Please note that this dictation was created using voice recognition software. I have made every reasonable attempt to correct obvious errors, but I expect that there are errors of grammar and  possibly content that I did not discover before finalizing the note.

## 2018-05-25 NOTE — ASSESSMENT & PLAN NOTE
Her depression has returned. She was on Wellbutrin in the past. Recently she has found herself thinking about her being insignificant. She is  with children. She has been easily moved to tears. Like to start Wellbutrin again. Denies any homicidal or suicidal ideations. Tries to be positive.

## 2018-05-25 NOTE — ASSESSMENT & PLAN NOTE
She was seen by physiatry. MRI of her back showed bulging disc but no significant stenosis. It was advised that she have physical therapy. She states that Norco helps with her back at nighttime. She also take a muscle relaxer. She is requesting a refill. She has back pain all day.

## 2018-07-12 ENCOUNTER — APPOINTMENT (OUTPATIENT)
Dept: RADIOLOGY | Facility: MEDICAL CENTER | Age: 38
End: 2018-07-12
Attending: EMERGENCY MEDICINE
Payer: COMMERCIAL

## 2018-07-12 ENCOUNTER — HOSPITAL ENCOUNTER (EMERGENCY)
Facility: MEDICAL CENTER | Age: 38
End: 2018-07-12
Attending: EMERGENCY MEDICINE
Payer: COMMERCIAL

## 2018-07-12 VITALS
TEMPERATURE: 98.1 F | RESPIRATION RATE: 18 BRPM | SYSTOLIC BLOOD PRESSURE: 160 MMHG | DIASTOLIC BLOOD PRESSURE: 72 MMHG | WEIGHT: 293 LBS | OXYGEN SATURATION: 94 % | HEIGHT: 65 IN | HEART RATE: 94 BPM | BODY MASS INDEX: 48.82 KG/M2

## 2018-07-12 DIAGNOSIS — M25.561 ACUTE PAIN OF RIGHT KNEE: ICD-10-CM

## 2018-07-12 PROCEDURE — 700111 HCHG RX REV CODE 636 W/ 250 OVERRIDE (IP): Performed by: EMERGENCY MEDICINE

## 2018-07-12 PROCEDURE — 99284 EMERGENCY DEPT VISIT MOD MDM: CPT

## 2018-07-12 PROCEDURE — 96372 THER/PROPH/DIAG INJ SC/IM: CPT

## 2018-07-12 PROCEDURE — 73562 X-RAY EXAM OF KNEE 3: CPT | Mod: RT

## 2018-07-12 RX ORDER — MELOXICAM 7.5 MG/1
7.5 TABLET ORAL DAILY
Qty: 30 TAB | Refills: 0 | Status: SHIPPED | OUTPATIENT
Start: 2018-07-12 | End: 2018-07-15

## 2018-07-12 RX ORDER — KETOROLAC TROMETHAMINE 30 MG/ML
60 INJECTION, SOLUTION INTRAMUSCULAR; INTRAVENOUS ONCE
Status: COMPLETED | OUTPATIENT
Start: 2018-07-12 | End: 2018-07-12

## 2018-07-12 RX ORDER — HYDROCODONE BITARTRATE AND ACETAMINOPHEN 5; 325 MG/1; MG/1
1-2 TABLET ORAL EVERY 4 HOURS PRN
Qty: 20 TAB | Refills: 0 | Status: SHIPPED | OUTPATIENT
Start: 2018-07-12 | End: 2018-07-15

## 2018-07-12 RX ADMIN — KETOROLAC TROMETHAMINE 60 MG: 30 INJECTION, SOLUTION INTRAMUSCULAR at 07:51

## 2018-07-12 NOTE — ED PROVIDER NOTES
ED Provider Note    CHIEF COMPLAINT  Chief Complaint   Patient presents with   • Knee Pain     the pt is c/o r knee pain w/o trauma or inj.        HPI  Tara Patterson is a 38 y.o. female who presents for evaluation of knee pain.  9 days ago the patient started having right knee pain.  She denies any specific trauma.  It is mostly in the posterior part of her right knee.  She states it feels like it is bone-on-bone.  She denies any numbness or weakness.  2 days ago the pain started getting significantly worse.  She states she has never had this problem before.    REVIEW OF SYSTEMS  See HPI for further details. All other systems negative.    PAST MEDICAL HISTORY  Past Medical History:   Diagnosis Date   • Diverticulosis    • DM (diabetes mellitus) (HCC)    • Hypertension    • PCOS (polycystic ovarian syndrome)        FAMILY HISTORY  Family History   Problem Relation Age of Onset   • Hypertension Mother    • Heart Disease Mother    • Arthritis Mother    • Sleep Apnea Mother    • Diabetes Father    • Heart Disease Father    • Sleep Apnea Sister    • Sleep Apnea Sister        SOCIAL HISTORY  Social History     Social History   • Marital status: Single     Spouse name: N/A   • Number of children: N/A   • Years of education: N/A     Occupational History   •       Social History Main Topics   • Smoking status: Never Smoker   • Smokeless tobacco: Never Used   • Alcohol use No   • Drug use: No   • Sexual activity: Yes     Partners: Male     Other Topics Concern   •  Service No   • Blood Transfusions No   • Caffeine Concern No   • Occupational Exposure No   • Hobby Hazards No   • Sleep Concern Yes   • Stress Concern No   • Weight Concern Yes   • Special Diet No   • Back Care No   • Exercise No   • Bike Helmet No   • Seat Belt No   • Self-Exams No     Social History Narrative   • No narrative on file       SURGICAL HISTORY  History reviewed. No pertinent surgical history.    CURRENT MEDICATIONS  Home  "Medications     Reviewed by Danita Friedman R.N. (Registered Nurse) on 07/12/18 at 0651  Med List Status: Partial   Medication Last Dose Status   baclofen (LIORESAL) 20 MG tablet  Active   Blood Glucose Monitoring Suppl Device 5/25/2018 Active   buPROPion (WELLBUTRIN XL) 150 MG XL tablet  Active   fluticasone (FLONASE) 50 MCG/ACT nasal spray 5/25/2018 Active   metformin (GLUCOPHAGE) 1000 MG tablet 5/25/2018 Active   naproxen (NAPROSYN) 500 MG Tab  Active   NON SPECIFIED 4/26/2018 Active   thyroid (ARMOUR THYROID) 60 MG Tab 5/25/2018 Active                ALLERGIES  Allergies   Allergen Reactions   • Asa [Aspirin] Vomiting and Swelling     Facial swelling, patient takes ibuprofen at home       PHYSICAL EXAM  VITAL SIGNS: /85   Pulse 94   Temp 36.7 °C (98.1 °F)   Resp 18   Ht 1.651 m (5' 5\")   Wt (!) 151 kg (332 lb 14.3 oz)   SpO2 91%   BMI 55.40 kg/m²   Constitutional: Well developed, obese, Non-toxic appearance.   HENT: Normocephalic, Atraumatic.  Eyes:  EOMI, Conjunctiva normal, No discharge.   Cardiovascular: Normal heart rate.   Thorax & Lungs: No respiratory distress.  Skin: Warm, Dry.  Musculoskeletal: Right lower extremity shows no obvious deformity.  She has no calf tenderness.  Distally she is neurovascularly intact.  Her body habitus makes it difficult to get a good exam on the knee.  It appears grossly to be stable.  Neurologic: Awake and alert.      RADIOLOGY/PROCEDURES  DX-KNEE 3 VIEWS RIGHT   Final Result         1.  No acute traumatic bony injury.   2.  Small bony spur at the superior margin of the patella.            COURSE & MEDICAL DECISION MAKING  Pertinent Labs & Imaging studies reviewed. (See chart for details)  This is a 38-year-old complaining of right knee pain.  She has had worsening pain over the past week or so.  She denies any specific trauma.  She has no history that would suggest gout however that certainly is in the differential diagnosis.  X-rays of the knee show no " evidence of acute bony abnormalities.  At this point I do not think the patient requires any further evaluation in the emergency department.  I will provide her with crutches.  She is treated with Toradol here in the emergency department.  I will provide her a prescription for meloxicam and Norco.  I reviewed her prescription monitoring report.  She will sign a consent for treatment with narcotics.  I referred her to Dr. Hollis of orthopedics for follow-up.  She is given a discharge instruction sheet on joint pain.    FINAL IMPRESSION  1.  Right knee pain  2.   3.         Electronically signed by: Malik Dorado, 7/12/2018 6:54 AM

## 2018-07-12 NOTE — DISCHARGE INSTRUCTIONS
Joint Pain  Joint pain, which is also called arthralgia, can be caused by many things. Joint pain often goes away when you follow your health care provider's instructions for relieving pain at home. However, joint pain can also be caused by conditions that require further treatment. Common causes of joint pain include:  · Bruising in the area of the joint.  · Overuse of the joint.  · Wear and tear on the joints that occur with aging (osteoarthritis).  · Various other forms of arthritis.  · A buildup of a crystal form of uric acid in the joint (gout).  · Infections of the joint (septic arthritis) or of the bone (osteomyelitis).  Your health care provider may recommend medicine to help with the pain. If your joint pain continues, additional tests may be needed to diagnose your condition.  Follow these instructions at home:  Watch your condition for any changes. Follow these instructions as directed to lessen the pain that you are feeling.  · Take medicines only as directed by your health care provider.  · Rest the affected area for as long as your health care provider says that you should. If directed to do so, raise the painful joint above the level of your heart while you are sitting or lying down.  · Do not do things that cause or worsen pain.  · If directed, apply ice to the painful area:  ¨ Put ice in a plastic bag.  ¨ Place a towel between your skin and the bag.  ¨ Leave the ice on for 20 minutes, 2-3 times per day.  · Wear an elastic bandage, splint, or sling as directed by your health care provider. Loosen the elastic bandage or splint if your fingers or toes become numb and tingle, or if they turn cold and blue.  · Begin exercising or stretching the affected area as directed by your health care provider. Ask your health care provider what types of exercise are safe for you.  · Keep all follow-up visits as directed by your health care provider. This is important.  Contact a health care provider if:  · Your  pain increases, and medicine does not help.  · Your joint pain does not improve within 3 days.  · You have increased bruising or swelling.  · You have a fever.  · You lose 10 lb (4.5 kg) or more without trying.  Get help right away if:  · You are not able to move the joint.  · Your fingers or toes become numb or they turn cold and blue.  This information is not intended to replace advice given to you by your health care provider. Make sure you discuss any questions you have with your health care provider.  Document Released: 12/18/2006 Document Revised: 05/19/2017 Document Reviewed: 09/29/2015  [employer] Interactive Patient Education © 2017 [employer]

## 2018-07-12 NOTE — ED NOTES
Pt discharged with written instructions. Pt verbalized understanding. Pt demonstrated proper crutch use. Pt's AAOx4. Pt discharged with RX for Mobic and Vicodin. Pt signed information sheet r/t narcotic use and safe keeping.

## 2018-07-15 ENCOUNTER — HOSPITAL ENCOUNTER (EMERGENCY)
Facility: MEDICAL CENTER | Age: 38
End: 2018-07-15
Attending: EMERGENCY MEDICINE
Payer: COMMERCIAL

## 2018-07-15 ENCOUNTER — APPOINTMENT (OUTPATIENT)
Dept: RADIOLOGY | Facility: MEDICAL CENTER | Age: 38
End: 2018-07-15
Attending: EMERGENCY MEDICINE
Payer: COMMERCIAL

## 2018-07-15 VITALS
TEMPERATURE: 98.7 F | BODY MASS INDEX: 48.82 KG/M2 | RESPIRATION RATE: 18 BRPM | WEIGHT: 293 LBS | HEIGHT: 65 IN | DIASTOLIC BLOOD PRESSURE: 94 MMHG | OXYGEN SATURATION: 97 % | SYSTOLIC BLOOD PRESSURE: 165 MMHG | HEART RATE: 94 BPM

## 2018-07-15 DIAGNOSIS — S83.91XA SPRAIN OF RIGHT KNEE, UNSPECIFIED LIGAMENT, INITIAL ENCOUNTER: ICD-10-CM

## 2018-07-15 PROCEDURE — 99284 EMERGENCY DEPT VISIT MOD MDM: CPT

## 2018-07-15 PROCEDURE — 73700 CT LOWER EXTREMITY W/O DYE: CPT | Mod: RT

## 2018-07-15 PROCEDURE — A9270 NON-COVERED ITEM OR SERVICE: HCPCS | Performed by: EMERGENCY MEDICINE

## 2018-07-15 PROCEDURE — 700102 HCHG RX REV CODE 250 W/ 637 OVERRIDE(OP): Performed by: EMERGENCY MEDICINE

## 2018-07-15 RX ORDER — NORETHINDRONE ACETATE AND ETHINYL ESTRADIOL .02; 1 MG/1; MG/1
1 TABLET ORAL DAILY
COMMUNITY

## 2018-07-15 RX ORDER — HYDROCODONE BITARTRATE AND ACETAMINOPHEN 5; 325 MG/1; MG/1
1 TABLET ORAL ONCE
Status: COMPLETED | OUTPATIENT
Start: 2018-07-15 | End: 2018-07-15

## 2018-07-15 RX ADMIN — HYDROCODONE BITARTRATE AND ACETAMINOPHEN 1 TABLET: 5; 325 TABLET ORAL at 13:10

## 2018-07-15 ASSESSMENT — PAIN SCALES - GENERAL: PAINLEVEL_OUTOF10: 5

## 2018-07-15 NOTE — ED PROVIDER NOTES
"ED Provider Note      CHIEF COMPLAINT   Chief Complaint   Patient presents with   • Knee Pain       HPI   Tara Patterson is a 38 y.o. female who presents with right knee pain.  Patient twisted her knee and couple days ago.  Seen in the ER.  Had x-rays done.  Advised rest, ice and given a prescription for Norco.  Has had persistent pain, but then again twisted it and has severe discomfort diffusely over the knee.  Unable to bear any weight.  Constant.  Feels swollen.  Feels the pain radiated all the way down and the foot.  Denies any significant blunt trauma or falls.  Denies paresthesia.      REVIEW OF SYSTEMS   Pertinent negative: As above.    PAST MEDICAL HISTORY   Past Medical History:   Diagnosis Date   • Diverticulosis    • DM (diabetes mellitus) (HCC)    • Hypertension    • PCOS (polycystic ovarian syndrome)        SOCIAL HISTORY  Social History   Substance Use Topics   • Smoking status: Never Smoker   • Smokeless tobacco: Never Used   • Alcohol use No       ALLERGIES   See chart    PHYSICAL EXAM  VITAL SIGNS: BP (!) 177/106   Pulse 96   Temp 36.7 °C (98 °F)   Resp 18   Ht 1.651 m (5' 5\") Comment: Stated  Wt (!) 151 kg (332 lb 14.3 oz)   LMP 06/23/2018   SpO2 96%   BMI 55.40 kg/m²   Head: Atraumatic  Eyes: Eyes normal inspection  Neck: has full range of motion, normal inspection.  Constitutional: No acute distress, medically obese  Cardiovascular: Normal heart rate. Pulses strong dorsalis pedis  Thorax & Lungs: No respiratory distress  Skin: Intact without laceration or abrasion  Musculoskeletal: Pain with any movement of the right knee.  Diffuse tenderness.  No obvious deformity.  Resists any sort of movement complicating ligamentous examination.  Cannot appreciate any remarkable swelling compartments soft.  Neurologic:  Normal sensory and motor    RADIOLOGY/PROCEDURES  CT-KNEE W/O PLUS RECONS RIGHT   Final Result      1.  No displaced fracture or dislocation of the right knee.      2.  Subtle " increased attenuation of the medullary portion of the distal lateral femoral condyle and proximal lateral plateau which may represent edema from bone contusion.      3.  Small joint effusion. No lipohemarthrosis is present.         Imaging is interpreted by radiologist reviewed by me    COURSE & MEDICAL DECISION MAKING  Analgesia for possible fracture-Norco    Patient presents with right knee pain.  Minimal mechanism repeat injury.  No large joint effusion although exam is limited because of body habitus.  No overt ligamentous instability.  Denies feeling cracks or pops or any displacement.  She had an x-ray previously.  I ordered a CT scan of the knee.  There is no evidence of fracture or tibial plateau fracture.  No suggestion of a knee dislocation that has spontaneously reduced.  She has good pulses.  Neurovascularly intact.  I suspect a sprain or perhaps ligamentous disruption.  I have ordered a knee immobilizer and crutches.  She should continue NSAIDs at home and Tylenol as needed.  Referred her to orthopedics, Dr. Price on call.  She should return to the ER for uncontrolled symptoms or concern.    Patient advised to see a primary provider for blood pressure management    FINAL IMPRESSION  1.  Internal derangement, right knee      This dictation was created using voice recognition software. The accuracy of the dictation is limited to the abilities of the software. I expect there may be some errors of grammar and possibly content. The nursing notes were reviewed and certain aspects of this information were incorporated into this note.      Electronically signed by: Eleazar Cr, 7/15/2018 1:05 PM

## 2018-07-15 NOTE — ED NOTES
"Pt was seen in our department this past Thursday and treated for right knee pain.  X-rays of the knee did not show any evidence of acute bony abnormalities.  She returns today after \"twisting\" her extremity with worsening pain.   "

## 2018-07-15 NOTE — DISCHARGE INSTRUCTIONS
Knee Sprain  Your exam shows you have a sprained knee. Symptoms include pain, swelling, and difficulty bearing weight on the injured side. The knee joint is supported by 4 major ligaments and 2 cartilages. Injury to any of these can make the knee unstable.  TREATMENT   Incomplete tears of the knee ligaments often heal without surgery if they are given proper rest and support. However, surgery may be needed with more severe knee sprains, because this can lead to long-term disability. Serious knee injuries are often evaluated and treated with surgery that uses a thin, lighted tube (arthroscope). This allows for a more accurate diagnosis, better treatment, and reduced disability.  HOME CARE INSTRUCTIONS   · Follow any weight-bearing instructions from your caregiver. Do not bear any weight if you were told not to.   · Knee splints and compression bandages will reduce motion, pain, and swelling. Do not remove your knee splint or bandage until your caregiver approves.   · Rest the injured leg as much as possible.   · Raise (elevate) the injured leg above the level of the heart to reduce swelling.   · Put ice on the injured area.   · Put ice in a plastic bag.   · Place a towel between your skin and the bag.   · Leave the ice on for 15-20 minutes at a time, every 1 to 2 hours while awake.   · Medicine to reduce inflammation and pain is often helpful.   · Follow up with your caregiver as directed.   SEEK MEDICAL CARE IF:   · Pain gets worse. This may be a sign of a broken (fractured) bone.   · Your injury is not improving after 1 week of treatment. Persistent pain and swelling, reduced motion, and locking or giving way of the knee means you need to see an orthopedic specialist.   Document Released: 01/25/2006 Document Revised: 03/11/2013 Document Reviewed: 05/20/2011  [employer]® Patient Information ©2013 [employer].

## 2018-07-15 NOTE — ED NOTES
Med Rec complete per Pt at bedside and [facility] @ [phone]  Allergies Reviewed  No ABX in the last 30 days

## 2018-08-07 ENCOUNTER — OFFICE VISIT (OUTPATIENT)
Dept: MEDICAL GROUP | Facility: MEDICAL CENTER | Age: 38
End: 2018-08-07
Payer: COMMERCIAL

## 2018-08-07 VITALS
TEMPERATURE: 97.2 F | SYSTOLIC BLOOD PRESSURE: 126 MMHG | BODY MASS INDEX: 48.82 KG/M2 | HEIGHT: 65 IN | HEART RATE: 105 BPM | OXYGEN SATURATION: 95 % | WEIGHT: 293 LBS | DIASTOLIC BLOOD PRESSURE: 84 MMHG

## 2018-08-07 DIAGNOSIS — E28.2 PCO (POLYCYSTIC OVARIES): ICD-10-CM

## 2018-08-07 DIAGNOSIS — N92.1 MENORRHAGIA WITH IRREGULAR CYCLE: ICD-10-CM

## 2018-08-07 DIAGNOSIS — M54.42 CHRONIC BILATERAL LOW BACK PAIN WITH BILATERAL SCIATICA: ICD-10-CM

## 2018-08-07 DIAGNOSIS — M54.41 CHRONIC BILATERAL LOW BACK PAIN WITH BILATERAL SCIATICA: ICD-10-CM

## 2018-08-07 DIAGNOSIS — M25.561 ACUTE PAIN OF RIGHT KNEE: ICD-10-CM

## 2018-08-07 DIAGNOSIS — F33.0 MILD EPISODE OF RECURRENT MAJOR DEPRESSIVE DISORDER (HCC): ICD-10-CM

## 2018-08-07 DIAGNOSIS — E11.42 TYPE 2 DIABETES MELLITUS WITH DIABETIC POLYNEUROPATHY, WITHOUT LONG-TERM CURRENT USE OF INSULIN (HCC): ICD-10-CM

## 2018-08-07 DIAGNOSIS — E66.01 MORBID OBESITY WITH BMI OF 50.0-59.9, ADULT (HCC): ICD-10-CM

## 2018-08-07 DIAGNOSIS — G89.29 CHRONIC BILATERAL LOW BACK PAIN WITH BILATERAL SCIATICA: ICD-10-CM

## 2018-08-07 PROCEDURE — 99214 OFFICE O/P EST MOD 30 MIN: CPT | Performed by: PHYSICIAN ASSISTANT

## 2018-08-07 RX ORDER — IBUPROFEN 800 MG/1
800 TABLET ORAL EVERY 8 HOURS PRN
Qty: 60 TAB | Refills: 1 | Status: SHIPPED | OUTPATIENT
Start: 2018-08-07

## 2018-08-07 RX ORDER — HYDROCODONE BITARTRATE AND ACETAMINOPHEN 5; 325 MG/1; MG/1
1-2 TABLET ORAL EVERY 4 HOURS PRN
COMMUNITY

## 2018-08-07 RX ORDER — GLIPIZIDE 5 MG/1
5 TABLET ORAL 2 TIMES DAILY
Qty: 60 TAB | Refills: 3 | Status: SHIPPED | OUTPATIENT
Start: 2018-08-07

## 2018-08-07 RX ORDER — BUPROPION HYDROCHLORIDE 150 MG/1
150 TABLET ORAL EVERY MORNING
Qty: 90 TAB | Refills: 3 | Status: SHIPPED | OUTPATIENT
Start: 2018-08-07

## 2018-08-07 NOTE — ASSESSMENT & PLAN NOTE
States that Wellbutrin has been effective with her depression. For the past couple weeks because of the knee she has been unable to walk and that Wellbutrin has been effective in controlling her depressive state. She denies any homicidal or suicidal ideations.

## 2018-08-07 NOTE — ASSESSMENT & PLAN NOTE
Still has chronic low back pain is worse when standing. Associated sciatica. She was seen in physiatry. MRI of the spine was rather unremarkable. She was happy about that. States that she will take naproxen but prefers ibuprofen. Naproxen has not been effective.

## 2018-08-07 NOTE — ASSESSMENT & PLAN NOTE
Chronic diabetes. She is requesting test strips today. She states that metformin thousand milligrams twice a day causes significant change of bowels with diarrhea. Therefore she doesn't take the medication daily. Requesting another medication. Last A1c was 8.2.

## 2018-08-07 NOTE — ASSESSMENT & PLAN NOTE
She states that her hysterectomy was pushed back in order to get approved by insurance. She has a prolapsed uterus and bladder. She is hoping to have the surgery performed by the end of the year.

## 2018-08-07 NOTE — PROGRESS NOTES
Subjective:   Tara Patetrson is a 38 y.o. female here today for follow-up from ER visit of right knee pain for 3 weeks, morbid obesity, chronic bilateral back pain, menorrhagia, depression and type 2 diabetes.    Morbid obesity with BMI of 50.0-59.9, adult ([facility])  She is recently been to a seminar at [facility]. Has yet to see the surgeon. I provided her Wellbutrin which has been effective and she has been losing some weight.    Acute pain of right knee  This is a 38-year-old female who was recently seen at the ER for trauma of the right knee. She will be starting physical therapy soon. He will be seen by orthopedics. CT of her right knee showed the following:        Impression     1. No displaced fracture or dislocation of the right knee.    2. Subtle increased attenuation of the medullary portion of the distal lateral femoral condyle and proximal lateral plateau which may represent edema from bone contusion.    3. Small joint effusion. No lipohemarthrosis is present.     She is using a walker. The pain is currently 8 out of 10. His been taking Tylenol without any improvement. Wants to discuss her nonsteroidals today.    Chronic bilateral low back pain with bilateral sciatica  Still has chronic low back pain is worse when standing. Associated sciatica. She was seen in physiatry. MRI of the spine was rather unremarkable. She was happy about that. States that she will take naproxen but prefers ibuprofen. Naproxen has not been effective.    Menorrhagia with irregular cycle  She states that her hysterectomy was pushed back in order to get approved by insurance. She has a prolapsed uterus and bladder. She is hoping to have the surgery performed by the end of the year.    Mild episode of recurrent major depressive disorder (HCC)  States that Wellbutrin has been effective with her depression. For the past couple weeks because of the knee she has been unable to walk and that Wellbutrin has been effective in  controlling her depressive state. She denies any homicidal or suicidal ideations.    Type 2 diabetes mellitus with neurologic complication (HCC)  Chronic diabetes. She is requesting test strips today. She states that metformin thousand milligrams twice a day causes significant change of bowels with diarrhea. Therefore she doesn't take the medication daily. Requesting another medication. Last A1c was 8.2.       Current medicines (including changes today)  Current Outpatient Prescriptions   Medication Sig Dispense Refill   • ibuprofen (MOTRIN) 800 MG Tab Take 1 Tab by mouth every 8 hours as needed. With food. 60 Tab 1   • Blood Glucose Monitoring Suppl Supplies Misc Test strips order: Test strips for True Metrix glucose meter. Sig: use BID and prn ssx high or low sugar #100 RF x 3 100 Strip 11   • glipiZIDE (GLUCOTROL) 5 MG Tab Take 1 Tab by mouth 2 times a day. 60 Tab 3   • metformin (GLUCOPHAGE) 500 MG Tab Take 1 Tab by mouth 2 times a day, with meals. 60 Tab 3   • buPROPion (WELLBUTRIN XL) 150 MG XL tablet Take 1 Tab by mouth every morning. 90 Tab 3   • norethindrone-ethinyl estradiol (MICROGESTIN) 1-20 MG-MCG per tablet Take 1 Tab by mouth every day.     • fluticasone (FLONASE) 50 MCG/ACT nasal spray Spray 1 Spray in nose 2 times a day. 16 g 5   • thyroid (ARMOUR THYROID) 60 MG Tab Take 1 Tab by mouth every day. 90 Tab 3   • HYDROcodone-acetaminophen (NORCO) 5-325 MG Tab per tablet Take 1-2 Tabs by mouth every four hours as needed.       No current facility-administered medications for this visit.      She  has a past medical history of Diverticulosis; DM (diabetes mellitus) (HCC); Hypertension; and PCOS (polycystic ovarian syndrome).    Social History and Family History were reviewed and updated.    ROS   No chest pain, no shortness of breath, no abdominal pain and all other systems were reviewed and are negative.       Objective:     Blood pressure 126/84, pulse (!) 105, temperature 36.2 °C (97.2 °F), height  "1.651 m (5' 5\"), weight (!) 146 kg (321 lb 14 oz), SpO2 95 %. Body mass index is 53.56 kg/m².   Physical Exam:  Constitutional: Alert, no distress.  Skin: Warm, dry, good turgor, no rashes in visible areas.  Eye: Equal, round and reactive, conjunctiva clear, lids normal.  ENMT: Lips without lesions, good dentition, oropharynx clear.  Neck: Trachea midline, no masses.   Lymph: No cervical or supraclavicular lymphadenopathy  Respiratory: Unlabored respiratory effort, lungs clear to auscultation, no wheezes, no ronchi.  Cardiovascular: Normal S1, S2, no murmur, no edema.  Abdomen: Soft, non-tender, no masses.  Psych: Alert and oriented x3, normal affect and mood.        Assessment and Plan:   The following treatment plan was discussed    1. Acute pain of right knee  Acute, new onset condition. Follow with Ortho and physical therapy. Provide Motrin as directed.  - ibuprofen (MOTRIN) 800 MG Tab; Take 1 Tab by mouth every 8 hours as needed. With food.  Dispense: 60 Tab; Refill: 1    2. Morbid obesity with BMI of 50.0-59.9, adult (HCC)  Chronic condition. Follow with bariatric surgery. Continue Wellbutrin as directed.  - buPROPion (WELLBUTRIN XL) 150 MG XL tablet; Take 1 Tab by mouth every morning.  Dispense: 90 Tab; Refill: 3    3. Mild episode of recurrent major depressive disorder (HCC)  Chronic condition. Stable. Prescribe Wellbutrin  mg tablets.  - buPROPion (WELLBUTRIN XL) 150 MG XL tablet; Take 1 Tab by mouth every morning.  Dispense: 90 Tab; Refill: 3    4. Chronic bilateral low back pain with bilateral sciatica  Chronic condition. Follow with physiatry if needed. Prescribed Motrin and advised not to take naproxen.  - ibuprofen (MOTRIN) 800 MG Tab; Take 1 Tab by mouth every 8 hours as needed. With food.  Dispense: 60 Tab; Refill: 1    5. Menorrhagia with irregular cycle  Chronic condition. Follow with gynecology. Await surgery date.    6. Type 2 diabetes mellitus with diabetic polyneuropathy, without " long-term current use of insulin (HCC)  Chronic condition. Likely uncontrolled. Renewed test strips. We'll Daniel metformin to 500 mg twice a day. This should lessen side effects. Prescribed glipizide 5 mg tablets. Take 5 mg one tablet for the first 3-4 days well checking sugars. Afterwards go to twice a day dosing. Follow-up in 3 months to repeat A1c.  - Blood Glucose Monitoring Suppl Supplies Misc; Test strips order: Test strips for True Metrix glucose meter. Sig: use BID and prn ssx high or low sugar #100 RF x 3  Dispense: 100 Strip; Refill: 11  - glipiZIDE (GLUCOTROL) 5 MG Tab; Take 1 Tab by mouth 2 times a day.  Dispense: 60 Tab; Refill: 3  - metformin (GLUCOPHAGE) 500 MG Tab; Take 1 Tab by mouth 2 times a day, with meals.  Dispense: 60 Tab; Refill: 3      Followup: Return in about 3 months (around 11/7/2018), or if symptoms worsen or fail to improve.    Please note that this dictation was created using voice recognition software. I have made every reasonable attempt to correct obvious errors, but I expect that there are errors of grammar and possibly content that I did not discover before finalizing the note.

## 2018-08-07 NOTE — ASSESSMENT & PLAN NOTE
She is recently been to a seminar at [facility]. Has yet to see the surgeon. I provided her Wellbutrin which has been effective and she has been losing some weight.

## 2018-08-07 NOTE — ASSESSMENT & PLAN NOTE
This is a 38-year-old female who was recently seen at the ER for trauma of the right knee. She will be starting physical therapy soon. He will be seen by orthopedics. CT of her right knee showed the following:        Impression     1. No displaced fracture or dislocation of the right knee.    2. Subtle increased attenuation of the medullary portion of the distal lateral femoral condyle and proximal lateral plateau which may represent edema from bone contusion.    3. Small joint effusion. No lipohemarthrosis is present.     She is using a walker. The pain is currently 8 out of 10. His been taking Tylenol without any improvement. Wants to discuss her nonsteroidals today.

## 2018-08-29 DIAGNOSIS — N92.1 MENORRHAGIA WITH IRREGULAR CYCLE: ICD-10-CM

## 2018-08-29 RX ORDER — NORETHINDRONE ACETATE AND ETHINYL ESTRADIOL 1.5-30(21)
1 KIT ORAL DAILY
Qty: 28 TAB | Refills: 3 | Status: SHIPPED | OUTPATIENT
Start: 2018-08-29

## 2018-08-31 DIAGNOSIS — R05.9 COUGH: ICD-10-CM

## 2018-08-31 DIAGNOSIS — R06.02 SOB (SHORTNESS OF BREATH): ICD-10-CM

## 2018-08-31 DIAGNOSIS — R91.8 NONSPECIFIC ABNORMAL FINDING OF LUNG FIELD: ICD-10-CM

## 2018-09-04 ENCOUNTER — PATIENT MESSAGE (OUTPATIENT)
Dept: HEALTH INFORMATION MANAGEMENT | Facility: OTHER | Age: 38
End: 2018-09-04

## 2018-09-05 ENCOUNTER — APPOINTMENT (OUTPATIENT)
Dept: PULMONOLOGY | Facility: HOSPICE | Age: 38
End: 2018-09-05
Payer: COMMERCIAL

## 2018-09-10 ENCOUNTER — APPOINTMENT (OUTPATIENT)
Dept: PULMONOLOGY | Facility: HOSPICE | Age: 38
End: 2018-09-10
Payer: COMMERCIAL

## 2018-09-10 ENCOUNTER — APPOINTMENT (OUTPATIENT)
Dept: PULMONOLOGY | Facility: HOSPICE | Age: 38
End: 2018-09-10
Attending: PHYSICIAN ASSISTANT
Payer: COMMERCIAL

## 2018-09-11 DIAGNOSIS — M54.41 CHRONIC BILATERAL LOW BACK PAIN WITH BILATERAL SCIATICA: ICD-10-CM

## 2018-09-11 DIAGNOSIS — G89.29 CHRONIC BILATERAL LOW BACK PAIN WITH BILATERAL SCIATICA: ICD-10-CM

## 2018-09-11 DIAGNOSIS — M54.42 CHRONIC BILATERAL LOW BACK PAIN WITH BILATERAL SCIATICA: ICD-10-CM

## 2018-09-12 RX ORDER — NAPROXEN 500 MG/1
TABLET ORAL
Qty: 60 TAB | Refills: 0 | Status: SHIPPED | OUTPATIENT
Start: 2018-09-12

## 2018-10-09 ENCOUNTER — TELEPHONE (OUTPATIENT)
Dept: PULMONOLOGY | Facility: HOSPICE | Age: 38
End: 2018-10-09

## 2018-10-09 DIAGNOSIS — R06.02 SOB (SHORTNESS OF BREATH): ICD-10-CM

## 2019-03-08 ENCOUNTER — PATIENT MESSAGE (OUTPATIENT)
Dept: HEALTH INFORMATION MANAGEMENT | Facility: OTHER | Age: 39
End: 2019-03-08

## 2019-03-31 NOTE — LETTER
March 29, 2018         Patient: Tara Patterson   YOB: 1980   Date of Visit: 3/29/2018           To Whom it May Concern:    Tara Patterson was seen in my clinic on 3/29/2018. Please excuse her absence from work due to her appointment.     If you have any questions or concerns, please don't hesitate to call.        Sincerely,           EDUARDA Foote  Electronically Signed      N/A

## 2021-11-30 NOTE — ASSESSMENT & PLAN NOTE
A1c is currently at 8.2. She is taking metformin 1000 mg twice a day.   Routing refill request to provider for review/approval because:  Labs not current:     Normal serum creatinine on file in past 12 months  Creatinine   Date Value Ref Range Status   10/26/2021 1.05 (H) 0.52 - 1.04 mg/dL Final   07/15/2020 1.11 (H) 0.52 - 1.04 mg/dL Final     Last Written Prescription Date:  9/5/21  Last Fill Quantity: 90,  # refills: 0   Last office visit: 9/14/2021 with prescribing provider:  House   Future Office Visit:   Next 5 appointments (look out 90 days)    Feb 09, 2022  8:20 AM  (Arrive by 8:00 AM)  Office Visit with Mikala Philip MD  [facility] ([facility] ) [address] [place] [address] [phone]        90 days pended.  Zoe Gomez RN  [facility]